# Patient Record
Sex: MALE | Race: WHITE | Employment: OTHER | URBAN - METROPOLITAN AREA
[De-identification: names, ages, dates, MRNs, and addresses within clinical notes are randomized per-mention and may not be internally consistent; named-entity substitution may affect disease eponyms.]

---

## 2017-05-15 ENCOUNTER — NON-PROVIDER VISIT (OUTPATIENT)
Dept: PULMONOLOGY | Facility: HOSPICE | Age: 77
End: 2017-05-15
Payer: MEDICARE

## 2017-05-15 ENCOUNTER — OFFICE VISIT (OUTPATIENT)
Dept: PULMONOLOGY | Facility: HOSPICE | Age: 77
End: 2017-05-15
Payer: MEDICARE

## 2017-05-15 VITALS
BODY MASS INDEX: 30.81 KG/M2 | WEIGHT: 208 LBS | HEART RATE: 60 BPM | RESPIRATION RATE: 16 BRPM | SYSTOLIC BLOOD PRESSURE: 138 MMHG | HEIGHT: 69 IN | DIASTOLIC BLOOD PRESSURE: 82 MMHG | OXYGEN SATURATION: 92 %

## 2017-05-15 DIAGNOSIS — R91.8 PULMONARY NODULES: ICD-10-CM

## 2017-05-15 DIAGNOSIS — G47.00 INSOMNIA, UNSPECIFIED TYPE: ICD-10-CM

## 2017-05-15 DIAGNOSIS — J43.2 CENTRILOBULAR EMPHYSEMA (HCC): ICD-10-CM

## 2017-05-15 DIAGNOSIS — G47.33 OBSTRUCTIVE SLEEP APNEA: ICD-10-CM

## 2017-05-15 PROCEDURE — G8432 DEP SCR NOT DOC, RNG: HCPCS | Performed by: NURSE PRACTITIONER

## 2017-05-15 PROCEDURE — 94060 EVALUATION OF WHEEZING: CPT | Performed by: INTERNAL MEDICINE

## 2017-05-15 PROCEDURE — 4040F PNEUMOC VAC/ADMIN/RCVD: CPT | Performed by: NURSE PRACTITIONER

## 2017-05-15 PROCEDURE — G8419 CALC BMI OUT NRM PARAM NOF/U: HCPCS | Performed by: NURSE PRACTITIONER

## 2017-05-15 PROCEDURE — 94726 PLETHYSMOGRAPHY LUNG VOLUMES: CPT | Performed by: INTERNAL MEDICINE

## 2017-05-15 PROCEDURE — 99214 OFFICE O/P EST MOD 30 MIN: CPT | Performed by: NURSE PRACTITIONER

## 2017-05-15 PROCEDURE — 1036F TOBACCO NON-USER: CPT | Performed by: NURSE PRACTITIONER

## 2017-05-15 PROCEDURE — 94729 DIFFUSING CAPACITY: CPT | Performed by: INTERNAL MEDICINE

## 2017-05-15 PROCEDURE — 1101F PT FALLS ASSESS-DOCD LE1/YR: CPT | Mod: 8P | Performed by: NURSE PRACTITIONER

## 2017-05-15 ASSESSMENT — PULMONARY FUNCTION TESTS
FEV1: 2.62
FEV1_PERCENT_PREDICTED: 104
FEV1_PERCENT_PREDICTED: 100
FEV1/FVC_PERCENT_PREDICTED: 75
FEV1/FVC_PERCENT_PREDICTED: 74
FEV1/FVC_PERCENT_CHANGE: 150
FVC: 4.74
FEV1: 2.53
FEV1/FVC: 53.69
FEV1/FVC_PERCENT_PREDICTED: 72
FVC_PERCENT_PREDICTED: 135
FEV1_PERCENT_CHANGE: 2
FVC_PERCENT_PREDICTED: 138
FEV1_PERCENT_CHANGE: 3
FEV1/FVC: 53
FEV1_PREDICTED: 2.51
FVC: 4.88
FVC_PREDICTED: 3.51

## 2017-05-15 NOTE — PROGRESS NOTES
Chief Complaint   Patient presents with   • Follow-Up       HPI:  Danielito Tolliver is a 76 y.o. year old male here today for follow-up on emphysema and JEFF.  PFT 2/12/2016 indicates % predicted, FEV1 of 2.36 L or 92% predicted, FEV1/FVC ratio 64, RV with 151% predicted, TLC is 124% predicted, and a DLCO of 90% predicted.  These are overall normal spirometry with slight obstructive ventilatory dysfunction.  He did have a positive bronchodilator response. Repeat PFT 5/15/2017 indicates FVC 4.74 L or 135% predicted, FEV1 2.53 L or 100% predicted, FEV1/FVC ratio 53, %, DLCO 90% predicted. Patient states he started exercising and losing weight and his dyspnea has significantly improved. He's been off any inhalers for 3 weeks. He was previoulsy compliant with Spiriva Respimat two inhalations once daily and Advair 250/50 mcg bid. He has not needed Proair. 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 Since then his energy levels have been better and he has been losing weight. He has lost 40lbs since his last OV. He started using beat juice daily as well which he contributes feeling better to.     He has a history of an abnormal CT scan with pulmonary nodules. The nodules were stable for 2 years; last CT was 7/21/2009.   Chest x-ray today indicates no consolidation or effusions.  cardiac silhouette, bones and vascular structure grossly unremarkable.  Lungs appear mildly hyperinflated.  There is a nodular opacity overlying the left fifth anterior rib.  CT scan at [facility] 2/23/16 indicates 8mm noncalcified pulmonary nodule in the right middle lobe and severe centrilobular emphysema. Addendum noted stability of nodule and no more imaging warranted. Results reviewed with patient.    Polysomnogram indicated an AHI of 15, minimum 02 saturation of 77%.  He has been compliant on auto  "Pap 5-15 cm of water pressure with 4 L bleed of oxygen.  CNOX 10/17/15 indicated normal oximetry on this setting. Compliance card 2/14/17-5/14/17 indicates 100% compliance, avg nightly use of 6hr 14min, AHi 0.9 and minimal mask leak. He tolerates mask/pressure well. He denies EDS or morning headaches. He has used Ambien 10mg rarely since last OV. He notes 1/3 of tablet maybe 3x's of use.  ROS: As per HPI and otherwise negative if not stated.    Past Medical History   Diagnosis Date   • Indigestion    • Pain      low back pain left side   • Hypercholesteremia    • JEFF (obstructive sleep apnea)      AUTO CPAP 5-15CM WITH O2 4 LPM   • Snoring      Uses CPAP   • Psychiatric problem      takes med for depression   • Dental disorder      Full Upper   • Cold      coughing up \"dirty\" sputum   • Infection      left knee post TKA   • Emphysema of lung (CMS-HCC)    • CATARACT      IOL OU   • Breath shortness      Uses 02 @ 4L per nc at home prn        Past Surgical History   Procedure Laterality Date   • Cataract extraction with iol       B/L   • Bronchoscopy-endo  3/25/2009     Performed by SINCERE PABLO at SURGERY [facility]   • Other       TKA B/L   • Other orthopedic surgery  3-21-16     \"Knee ReplacementSurgeries Left kneex3 Rightx2\"    • Other cardiac surgery  2013     \"Open Heart-Coronary Artery Bypass, Heart Stent\"   • Knee arthrotomy Left 3/24/2016     Procedure: KNEE ARTHROTOMY-HARDWARE REMOVAL AND HETEROTOPIC OSSIFICATION;  Surgeon: Yasmani Bradley M.D.;  Location: SURGERY [facility];  Service:        History reviewed. No pertinent family history.    Social History     Social History   • Marital Status:      Spouse Name: N/A   • Number of Children: N/A   • Years of Education: N/A     Occupational History   • Not on file.     Social History Main Topics   • Smoking status: Former Smoker -- 1.00 packs/day for 25 years     Types: Cigarettes     Quit date: 01/01/1998   • Smokeless tobacco: Never " "Used   • Alcohol Use: No   • Drug Use: No   • Sexual Activity: Not on file     Other Topics Concern   • Not on file     Social History Narrative       Allergies as of 05/15/2017 - Cordell as Reviewed 05/15/2017   Allergen Reaction Noted   • Morphine Anxiety 03/21/2016        @Vital signs for this encounter:  Filed Vitals:    05/15/17 1300   Height: 1.753 m (5' 9.02\")   Weight: 94.348 kg (208 lb)   Weight % change since last entry.: 0 %   BP: 138/82   Pulse: 60   BMI (Calculated): 30.7   Resp: 16   O2 sat % room air: 92 %       Current medications as of today   Current Outpatient Prescriptions   Medication Sig Dispense Refill   • EFFIENT 10 MG Tab TK 1 T PO QD  4   • aspirin 81 MG tablet Take 81 mg by mouth every day.     • Tiotropium Bromide-Olodaterol (STIOLTO RESPIMAT) 2.5-2.5 MCG/ACT Aero Soln Take 2 Puffs by mouth every day. 1 Inhaler 5   • zolpidem (AMBIEN) 10 MG Tab Take 1 Tab by mouth at bedtime as needed for Sleep. May take 1/2 - 1 tab nightly 30 Tab 1   • fluticasone-salmeterol (ADVAIR DISKUS) 250-50 MCG/DOSE AEROSOL POWDER, BREATH ACTIVATED Inhale 1 Puff by mouth 2 times a day. 1 Inhaler 5   • Tiotropium Bromide Monohydrate (SPIRIVA RESPIMAT) 2.5 MCG/ACT Aero Soln Inhale 2 Puffs by mouth every day. 1 Inhaler 5   • albuterol 108 (90 BASE) MCG/ACT Aero Soln inhalation aerosol Inhale 2 Puffs by mouth every 6 hours as needed for Shortness of Breath. 8.5 g 5   • simvastatin (ZOCOR) 80 MG tablet Take 80 mg by mouth every day.     • celecoxib (CELEBREX) 200 MG Cap Take 200 mg by mouth Pre-Op Once.     • Multiple Vitamins-Minerals (OCUVITE PO) Take 1 Cap by mouth every day.     • Multiple Vitamins-Minerals (CENTRUM SILVER ADULT 50+ PO) Take 1 Tab by mouth every day.     • Omega-3 Fatty Acids (FISH OIL PO) Take 1 Cap by mouth every day.     • Coenzyme Q10 (COQ-10) 100 MG Cap Take 1 Cap by mouth every day.     • Ascorbic Acid (VITAMIN C PO) Take 500 mg by mouth every day.     • lisinopril (PRINIVIL) 5 MG Tab Take 5 mg " by mouth every day.     • Cobalamine Combinations (VITAMIN B12-FOLIC ACID PO) Take 1 Tab by mouth every day.     • CEPHALEXIN 500 MG PO CAPS Take 1,000 mg by mouth every day. For an infection in the leg for 7 years.     • aspirin (ASA) 325 MG Tab Take 325 mg by mouth every day.       No current facility-administered medications for this visit.         Physical Exam:   Gen:           Alert and oriented, No apparent distress. Mood and affect appropriate, normal interaction with examiner.  Eyes:          PERRL, EOM intact, sclere white, conjunctive moist.  Ears:          Not examined.  Hearing:     Grossly intact.  Nose:          Normal, no lesions or deformities.  Dentition:    Good dentition.  Oropharynx:   Tongue normal, posterior pharynx without erythema or exudate.  Mallampati Classification: 3  Neck:        Supple, trachea midline, no masses.  Respiratory Effort: No intercostal retractions or use of accessory muscles.   Lung Auscultation:      Clear to auscultation bilaterally; no rales, rhonchi or wheezing.  CV:            Regular rate and rhythm. No murmurs, rubs or gallops.  Abd:           Not examined.   Lymphadenopathy: Not examined.  Gait and Station: Normal.  Digits and Nails: No clubbing, cyanosis, petechiae, or nodes.   Cranial Nerves: II-XII grossly intact.  Skin:        No rashes, lesions or ulcers noted.               Ext:           No cyanosis or edema.      Assessment:  1. Centrilobular emphysema (CMS-HCC)     2. Pulmonary nodules     3. Obstructive sleep apnea     4. Insomnia, unspecified type         Immunizations:    Flu:2016  Pneumovax 23:2014  Prevnar 13:2015    Plan:  1. Patient's COPD has significantly improved with 40lb weight loss. He notes minimal dyspnea and reviewed appropriate RESHMA use. If dyspnea increased, advised returning to On license of [facility] 2puffs once daily.  2. Continue CPAP nightly w/O2 bleed in.  3. Continue with regular exercise/weight loss.  4. Discussed sleep and respiratory  hygiene.  5. DME order mask/supplies.  6. Follow up in 1 year with compliance card, sooner if needed.

## 2017-05-15 NOTE — PROCEDURES
Technician: Kim Reyes, RRT/CPFT  Technician Comments:  Good patient effort & cooperation.  The results of this test meet the ATS/ERS standards for acceptability and repeatability.  Test was performed on the [employer] Body Plethysmograph- Elite DX system.  Predicted equations for Spirometry are NHanes, DLCO- [facility].  The DLCO was uncorrected for Hgb.  A bronchodilator of Ventolin HFA- 2puffs via spacer were administered.    Interpretation:    Spirometry reveals a normal forced vital capacity and FEV1 but a significantly reduced FEV1 to FVC ratio. Compatible with an obstructive pattern which the flow volume loop confirms. There is no significant change in flow rates following inhaled bronchodilators. Hyperinflation is evident from the increase in residual volume 250% of predicted. Diffusion and airways resistance are both normal. Overall this PFT is compatible with an obstructive pattern but normal large airway flow rates with a reduction in the FEF 25-75%, indicative of small airways dysfunction.

## 2017-05-15 NOTE — MR AVS SNAPSHOT
"        Danielito Tolliver   5/15/2017 12:00 PM   Appointment   MRN: [id number]    Department:  [facility]   Dept Phone:  [phone]    Description:  Male : 1940   Provider:  [id number]           Allergies as of 5/15/2017     Allergen Noted Reactions    Morphine 2016   Anxiety    \"Hard time staying in the bed\"      Vital Signs     Smoking Status                   Former Smoker           Basic Information     Date Of Birth Sex Race Ethnicity Preferred Language    1940 Male White Unknown English      Your appointments     May 15, 2017  1:00 PM   Established Patient Pul with ERLIN Garcia   [facility] (--)    [address]  [address]  [place] [address] [phone]              Problem List              ICD-10-CM Priority Class Noted - Resolved    dyslipidemia I10   2012 - Present    Dyslipidemia E78.5   2012 - Present    Artificial joint pain (CMS-HCC) T84.84XA   3/24/2016 - Present    Centrilobular emphysema (CMS-HCC) J43.2   2016 - Present    Obstructive sleep apnea G47.33   2016 - Present    Pulmonary nodules R91.8   2016 - Present    Insomnia G47.00   2016 - Present      Health Maintenance        Date Due Completion Dates    IMM DTaP/Tdap/Td Vaccine (1 - Tdap) 1959 ---    COLONOSCOPY 1990 ---    IMM ZOSTER VACCINE 2000 ---    IMM PNEUMOCOCCAL 65+ (ADULT) LOW/MEDIUM RISK SERIES (2 of 2 - PPSV23) 2016            Current Immunizations     13-VALENT PCV PREVNAR 2015    Influenza Vaccine Adult HD 2016, 2015      Below and/or attached are the medications your provider expects you to take. Review all of your home medications and newly ordered medications with your provider and/or pharmacist. Follow medication instructions as directed by your provider and/or pharmacist. Please keep your medication list with you and share with your provider. Update the information when medications " are discontinued, doses are changed, or new medications (including over-the-counter products) are added; and carry medication information at all times in the event of emergency situations     Allergies:  MORPHINE - Anxiety               Medications  Valid as of: May 15, 2017 - 12:51 PM    Generic Name Brand Name Tablet Size Instructions for use    Albuterol Sulfate (Aero Soln) albuterol 108 (90 BASE) MCG/ACT Inhale 2 Puffs by mouth every 6 hours as needed for Shortness of Breath.        Ascorbic Acid   Take 500 mg by mouth every day.        Aspirin (Tab)  MG Take 325 mg by mouth every day.        Aspirin (Tab) aspirin 81 MG Take 81 mg by mouth every day.        Celecoxib (Cap) CELEBREX 200 MG Take 200 mg by mouth Pre-Op Once.        Cephalexin (Cap) KEFLEX 500 MG Take 1,000 mg by mouth every day. For an infection in the leg for 7 years.        Cobalamine Combinations   Take 1 Tab by mouth every day.        Coenzyme Q10 (Cap) CoQ-10 100 MG Take 1 Cap by mouth every day.        Fluticasone-Salmeterol (AEROSOL POWDER, BREATH ACTIVATED) ADVAIR 250-50 MCG/DOSE Inhale 1 Puff by mouth 2 times a day.        Lisinopril (Tab) PRINIVIL 5 MG Take 5 mg by mouth every day.        Multiple Vitamins-Minerals   Take 1 Cap by mouth every day.        Multiple Vitamins-Minerals   Take 1 Tab by mouth every day.        Omega-3 Fatty Acids   Take 1 Cap by mouth every day.        Prasugrel HCl (Tab) EFFIENT 10 MG TK 1 T PO QD        Simvastatin (Tab) ZOCOR 80 MG Take 80 mg by mouth every day.        Tiotropium Bromide Monohydrate (Aero Soln) Tiotropium Bromide Monohydrate 2.5 MCG/ACT Inhale 2 Puffs by mouth every day.        Tiotropium Bromide-Olodaterol (Aero Soln) Tiotropium Bromide-Olodaterol 2.5-2.5 MCG/ACT Take 2 Puffs by mouth every day.        Zolpidem Tartrate (Tab) AMBIEN 10 MG Take 1 Tab by mouth at bedtime as needed for Sleep. May take 1/2 - 1 tab nightly        .                 Medicines prescribed today were sent to:       [id number] - [place] [address] [address]  AT [facility] OF [facility] & [place] [id number] [facility] [address]    Phone: [phone] Fax: [phone]    Open 24 Hours?: No      Medication refill instructions:       If your prescription bottle indicates you have medication refills left, it is not necessary to call your provider’s office. Please contact your pharmacy and they will refill your medication.    If your prescription bottle indicates you do not have any refills left, you may request refills at any time through one of the following ways: The online [employer] system (except Urgent Care), by calling your provider’s office, or by asking your pharmacy to contact your provider’s office with a refill request. Medication refills are processed only during regular business hours and may not be available until the next business day. Your provider may request additional information or to have a follow-up visit with you prior to refilling your medication.   *Please Note: Medication refills are assigned a new Rx number when refilled electronically. Your pharmacy may indicate that no refills were authorized even though a new prescription for the same medication is available at the pharmacy. Please request the medicine by name with the pharmacy before contacting your provider for a refill.        Other Notes About Your Plan     DME: [facility] [id number]           MyChart Status: Patient Declined

## 2017-05-15 NOTE — MR AVS SNAPSHOT
"        Danielito Tolliver   5/15/2017 1:00 PM   Office Visit   MRN: [id number]    Department:  [facility]   Dept Phone:  [phone]    Description:  Male : 1940   Provider:  ERLIN Garcia           Reason for Visit     Follow-Up           Allergies as of 5/15/2017     Allergen Noted Reactions    Morphine 2016   Anxiety    \"Hard time staying in the bed\"      You were diagnosed with     Centrilobular emphysema (CMS-HCC)   [295019]       Pulmonary nodules   [855452]       Obstructive sleep apnea   [618776]       Insomnia, unspecified type   [0219567]         Vital Signs     Blood Pressure Pulse Respirations Height Weight Body Mass Index    138/82 mmHg 60 16 1.753 m (5' 9.02\") 94.348 kg (208 lb) 30.70 kg/m2    Oxygen Saturation Smoking Status                92% Former Smoker          Basic Information     Date Of Birth Sex Race Ethnicity Preferred Language    1940 Male White Unknown English      Problem List              ICD-10-CM Priority Class Noted - Resolved    dyslipidemia I10   2012 - Present    Dyslipidemia E78.5   2012 - Present    Artificial joint pain (CMS-HCC) T84.84XA   3/24/2016 - Present    Centrilobular emphysema (CMS-HCC) J43.2   2016 - Present    Obstructive sleep apnea G47.33   2016 - Present    Pulmonary nodules R91.8   2016 - Present    Insomnia G47.00   2016 - Present      Health Maintenance        Date Due Completion Dates    IMM DTaP/Tdap/Td Vaccine (1 - Tdap) 1959 ---    COLONOSCOPY 1990 ---    IMM ZOSTER VACCINE 2000 ---    IMM PNEUMOCOCCAL 65+ (ADULT) LOW/MEDIUM RISK SERIES (2 of 2 - PPSV23) 2016            Current Immunizations     13-VALENT PCV PREVNAR 2015    Influenza Vaccine Adult HD 2016, 2015      Below and/or attached are the medications your provider expects you to take. Review all of your home medications and newly ordered medications with your provider and/or " pharmacist. Follow medication instructions as directed by your provider and/or pharmacist. Please keep your medication list with you and share with your provider. Update the information when medications are discontinued, doses are changed, or new medications (including over-the-counter products) are added; and carry medication information at all times in the event of emergency situations     Allergies:  MORPHINE - Anxiety               Medications  Valid as of: May 15, 2017 -  1:34 PM    Generic Name Brand Name Tablet Size Instructions for use    Albuterol Sulfate (Aero Soln) albuterol 108 (90 BASE) MCG/ACT Inhale 2 Puffs by mouth every 6 hours as needed for Shortness of Breath.        Ascorbic Acid   Take 500 mg by mouth every day.        Aspirin (Tab)  MG Take 325 mg by mouth every day.        Aspirin (Tab) aspirin 81 MG Take 81 mg by mouth every day.        Celecoxib (Cap) CELEBREX 200 MG Take 200 mg by mouth Pre-Op Once.        Cephalexin (Cap) KEFLEX 500 MG Take 1,000 mg by mouth every day. For an infection in the leg for 7 years.        Cobalamine Combinations   Take 1 Tab by mouth every day.        Coenzyme Q10 (Cap) CoQ-10 100 MG Take 1 Cap by mouth every day.        Fluticasone-Salmeterol (AEROSOL POWDER, BREATH ACTIVATED) ADVAIR 250-50 MCG/DOSE Inhale 1 Puff by mouth 2 times a day.        Lisinopril (Tab) PRINIVIL 5 MG Take 5 mg by mouth every day.        Multiple Vitamins-Minerals   Take 1 Cap by mouth every day.        Multiple Vitamins-Minerals   Take 1 Tab by mouth every day.        Omega-3 Fatty Acids   Take 1 Cap by mouth every day.        Prasugrel HCl (Tab) EFFIENT 10 MG TK 1 T PO QD        Simvastatin (Tab) ZOCOR 80 MG Take 80 mg by mouth every day.        Tiotropium Bromide Monohydrate (Aero Soln) Tiotropium Bromide Monohydrate 2.5 MCG/ACT Inhale 2 Puffs by mouth every day.        Tiotropium Bromide-Olodaterol (Aero Soln) Tiotropium Bromide-Olodaterol 2.5-2.5 MCG/ACT Take 2 Puffs by mouth  every day.        Zolpidem Tartrate (Tab) AMBIEN 10 MG Take 1 Tab by mouth at bedtime as needed for Sleep. May take 1/2 - 1 tab nightly        .                 Medicines prescribed today were sent to:     [id number] - [place] AT [facility] & [address] [address]    Phone: [phone] Fax: [phone]    Open 24 Hours?: No      Medication refill instructions:       If your prescription bottle indicates you have medication refills left, it is not necessary to call your provider’s office. Please contact your pharmacy and they will refill your medication.    If your prescription bottle indicates you do not have any refills left, you may request refills at any time through one of the following ways: The online [employer] system (except Urgent Care), by calling your provider’s office, or by asking your pharmacy to contact your provider’s office with a refill request. Medication refills are processed only during regular business hours and may not be available until the next business day. Your provider may request additional information or to have a follow-up visit with you prior to refilling your medication.   *Please Note: Medication refills are assigned a new Rx number when refilled electronically. Your pharmacy may indicate that no refills were authorized even though a new prescription for the same medication is available at the pharmacy. Please request the medicine by name with the pharmacy before contacting your provider for a refill.        Your To Do List     Future Labs/Procedures Complete By Expires    AMB SPIROMETRY  As directed 5/15/2018    Comments:    At next OV      Other Notes About Your Plan     DME: [facility] [phone] [phone]           [employer] Status: Patient Declined

## 2018-06-01 ENCOUNTER — OFFICE VISIT (OUTPATIENT)
Dept: PULMONOLOGY | Facility: HOSPICE | Age: 78
End: 2018-06-01
Payer: MEDICARE

## 2018-06-01 VITALS
DIASTOLIC BLOOD PRESSURE: 62 MMHG | HEART RATE: 67 BPM | OXYGEN SATURATION: 93 % | WEIGHT: 212 LBS | BODY MASS INDEX: 31.4 KG/M2 | TEMPERATURE: 97.9 F | SYSTOLIC BLOOD PRESSURE: 138 MMHG | HEIGHT: 69 IN | RESPIRATION RATE: 16 BRPM

## 2018-06-01 DIAGNOSIS — G47.33 OBSTRUCTIVE SLEEP APNEA: ICD-10-CM

## 2018-06-01 DIAGNOSIS — G47.00 INSOMNIA, UNSPECIFIED TYPE: ICD-10-CM

## 2018-06-01 DIAGNOSIS — J43.2 CENTRILOBULAR EMPHYSEMA (HCC): ICD-10-CM

## 2018-06-01 DIAGNOSIS — R91.8 PULMONARY NODULES: ICD-10-CM

## 2018-06-01 PROCEDURE — 99214 OFFICE O/P EST MOD 30 MIN: CPT | Performed by: NURSE PRACTITIONER

## 2018-06-01 NOTE — PROGRESS NOTES
"Chief Complaint   Patient presents with   • Annual Exam       HPI:  Danielito Tolliver is a 77 y.o. year old male here today for follow-up on emphysema and JEFF. Former smoker, quit 1998 with 25PYH. PFT 5/15/2017 indicates FVC 4.74 L or 135% predicted, FEV1 2.53 L or 100% predicted, FEV1/FVC ratio 53, %, DLCO 90% predicted. Patient underwent heart cath last year and started having weight loss. His dyspnea significanly improved and he stopped inhalers.  He started using beat juice daily as well which he contributes feeling better to. He feels great and is motivated to lose 10lbs. He denies cough, phlegm, chest pain or wheezing. Regular exercise makes his breathing better he states.     He has a history of an abnormal CT scan with pulmonary nodules. The nodules were stable for 2 years; last CT was 7/21/2009. CT scan at [facility] 2/23/16 indicates 8mm noncalcified pulmonary nodule in the right middle lobe and severe centrilobular emphysema. Addendum noted stability of nodule and no more imaging warranted. Results reviewed with patient.     Polysomnogram indicated an AHI of 15, minimum 02 saturation of 77%.  He has been compliant on auto Pap 5-15 cm of water pressure with 4 L bleed of oxygen.  CNOX 10/17/15 indicated normal oximetry on this setting. Compliance card 5/2/2018 through 5/31/2018 indicates 100% compliance, average daily usage of 6 hours 41 minutes, no significant mask leaking and an overall AHI of 1.2. Mean pressure 5.8 cm H2O.. He tolerates mask/pressure well. He denies EDS or morning headaches. Sleep study weight 233lbs and weight today 212lbs.      ROS: As per HPI and otherwise negative if not stated.    Past Medical History:   Diagnosis Date   • Breath shortness     Uses 02 @ 4L per nc at home prn    • CATARACT     IOL OU   • Cold     coughing up \"dirty\" sputum   • Dental disorder     Full Upper   • Emphysema of lung (HCC)    • Hypercholesteremia    • Indigestion    • Infection     left knee post TKA " "  • JEFF (obstructive sleep apnea)     AUTO CPAP 5-15CM WITH O2 4 LPM   • Pain     low back pain left side   • Psychiatric problem     takes med for depression   • Snoring     Uses CPAP       Past Surgical History:   Procedure Laterality Date   • KNEE ARTHROTOMY Left 3/24/2016    Procedure: KNEE ARTHROTOMY-HARDWARE REMOVAL AND HETEROTOPIC OSSIFICATION;  Surgeon: Yasmani Bradley M.D.;  Location: SURGERY [facility];  Service:    • OTHER ORTHOPEDIC SURGERY  3-21-16    \"Knee ReplacementSurgeries Left kneex3 Rightx2\"    • OTHER CARDIAC SURGERY  2013    \"Open Heart-Coronary Artery Bypass, Heart Stent\"   • BRONCHOSCOPY-ENDO  3/25/2009    Performed by SINCERE PABLO at SURGERY [facility]   • CATARACT EXTRACTION WITH IOL      B/L   • OTHER      TKA B/L       History reviewed. No pertinent family history.    Social History     Social History   • Marital status:      Spouse name: N/A   • Number of children: N/A   • Years of education: N/A     Occupational History   • Not on file.     Social History Main Topics   • Smoking status: Former Smoker     Packs/day: 1.00     Years: 25.00     Types: Cigarettes     Quit date: 1/1/1998   • Smokeless tobacco: Never Used   • Alcohol use No   • Drug use: No   • Sexual activity: Not on file     Other Topics Concern   • Not on file     Social History Narrative   • No narrative on file       Allergies as of 06/01/2018 - Reviewed 06/01/2018   Allergen Reaction Noted   • Morphine Anxiety 03/21/2016        @Vital signs for this encounter:  Vitals:    06/01/18 1342   BP: 138/62   Pulse: 67   Resp: 16   Temp: 36.6 °C (97.9 °F)   O2 sat % room air: 93 %       Current medications as of today   Current Outpatient Prescriptions   Medication Sig Dispense Refill   • aspirin 81 MG tablet Take 81 mg by mouth every day.     • simvastatin (ZOCOR) 80 MG tablet Take 80 mg by mouth every day.     • Multiple Vitamins-Minerals (OCUVITE PO) Take 1 Cap by mouth every day.     • Multiple " Vitamins-Minerals (CENTRUM SILVER ADULT 50+ PO) Take 1 Tab by mouth every day.     • Omega-3 Fatty Acids (FISH OIL PO) Take 1 Cap by mouth every day.     • Coenzyme Q10 (COQ-10) 100 MG Cap Take 1 Cap by mouth every day.     • Ascorbic Acid (VITAMIN C PO) Take 500 mg by mouth every day.     • lisinopril (PRINIVIL) 5 MG Tab Take 5 mg by mouth every day.     • Cobalamine Combinations (VITAMIN B12-FOLIC ACID PO) Take 1 Tab by mouth every day.     • CEPHALEXIN 500 MG PO CAPS Take 1,000 mg by mouth every day. For an infection in the leg for 7 years.     • sertraline (ZOLOFT) 50 MG Tab TK 1 T PO QD  2   • BOOSTRIX 5-2.5-18.5 Suspension ADM 0.5ML IM UTD  0   • EFFIENT 10 MG Tab TK 1 T PO QD  4   • Tiotropium Bromide-Olodaterol (STIOLTO RESPIMAT) 2.5-2.5 MCG/ACT Aero Soln Take 2 Puffs by mouth every day. 1 Inhaler 5   • zolpidem (AMBIEN) 10 MG Tab Take 1 Tab by mouth at bedtime as needed for Sleep. May take 1/2 - 1 tab nightly 30 Tab 1   • fluticasone-salmeterol (ADVAIR DISKUS) 250-50 MCG/DOSE AEROSOL POWDER, BREATH ACTIVATED Inhale 1 Puff by mouth 2 times a day. 1 Inhaler 5   • Tiotropium Bromide Monohydrate (SPIRIVA RESPIMAT) 2.5 MCG/ACT Aero Soln Inhale 2 Puffs by mouth every day. 1 Inhaler 5   • albuterol 108 (90 BASE) MCG/ACT Aero Soln inhalation aerosol Inhale 2 Puffs by mouth every 6 hours as needed for Shortness of Breath. 8.5 g 5   • celecoxib (CELEBREX) 200 MG Cap Take 200 mg by mouth Pre-Op Once.     • aspirin (ASA) 325 MG Tab Take 325 mg by mouth every day.       No current facility-administered medications for this visit.          Physical Exam:   Gen:           Alert and oriented, No apparent distress. Mood and affect appropriate, normal interaction with examiner.  Eyes:          PERRL, EOM intact, sclere white, conjunctive moist.  Ears:          Not examined.   Hearing:     Grossly intact.  Nose:          Normal, no lesions or deformities.  Dentition:    Good dentition.  Oropharynx:   Tongue normal, posterior  pharynx without erythema or exudate.  Mallampati Classification: 3  Neck:        Supple, trachea midline, no masses.  Respiratory Effort: No intercostal retractions or use of accessory muscles.   Lung Auscultation:      Clear to auscultation bilaterally; no rales, rhonchi or wheezing.  CV:            Regular rate and rhythm. No murmurs, rubs or gallops.  Abd:           Not examined.   Lymphadenopathy: Not examined.  Gait and Station: Normal.  Digits and Nails: No clubbing, cyanosis, petechiae, or nodes.   Cranial Nerves: II-XII grossly intact.  Skin:        No rashes, lesions or ulcers noted.               Ext:           No cyanosis or edema.      Assessment:  1. Centrilobular emphysema (HCC)     2. Pulmonary nodules     3. Obstructive sleep apnea     4. Insomnia, unspecified type         Immunizations:    Flu:11/2017  Pneumovax 23:2014  Prevnar 13:11/2015    Plan: Emphysema and JEFF clinically stable. No change in treatment plan.  1. Continue RESHMA prn if needed.  2. PFT at next OV.  3. Continue CPAP w/O2 bleed in.  DME mask/supplies.  4. Encouraged weight loss.  5. Discussed sleep and respiratory hygiene.  6. Follow up in 1 year at pulmonary clinic with PFT, sooner if needed.  Follow up in 1 year at sleep center with compliance card, sooner if needed.

## 2019-06-03 ENCOUNTER — SLEEP CENTER VISIT (OUTPATIENT)
Dept: SLEEP MEDICINE | Facility: MEDICAL CENTER | Age: 79
End: 2019-06-03
Payer: MEDICARE

## 2019-06-03 VITALS
SYSTOLIC BLOOD PRESSURE: 120 MMHG | RESPIRATION RATE: 16 BRPM | HEIGHT: 69 IN | BODY MASS INDEX: 31.99 KG/M2 | WEIGHT: 216 LBS | DIASTOLIC BLOOD PRESSURE: 62 MMHG | HEART RATE: 62 BPM | OXYGEN SATURATION: 93 %

## 2019-06-03 DIAGNOSIS — G47.00 INSOMNIA, UNSPECIFIED TYPE: ICD-10-CM

## 2019-06-03 DIAGNOSIS — G47.33 OBSTRUCTIVE SLEEP APNEA: Chronic | ICD-10-CM

## 2019-06-03 DIAGNOSIS — Z87.891 FORMER SMOKER: ICD-10-CM

## 2019-06-03 DIAGNOSIS — J43.2 CENTRILOBULAR EMPHYSEMA (HCC): Chronic | ICD-10-CM

## 2019-06-03 PROCEDURE — 99214 OFFICE O/P EST MOD 30 MIN: CPT | Performed by: NURSE PRACTITIONER

## 2019-06-03 NOTE — LETTER
"   ERLIN Garcia  [facility]   [address] [place] [address]  Phone: [phone] - Fax: [phone]           Encounter Date: 6/3/2019  Provider: ERLIN Garcia  Location of Care: [facility]      Patient:   Danielito Tolliver   MR Number: [id number]   YOB: 1940     PROGRESS NOTE:  Chief Complaint   Patient presents with   • Apnea      auto Pap 5-15 cm of water pressure with 4 L bleed of oxygen       HPI:  Danielito Tolliver is a 78 y.o. year old male here today for follow-up on sleep apnea.    PATIENT IS ALSO PULMONARY PATIENT; SEE DICTATION 6/1/18 BY LEILANI SOMMER.    HST 2013 indicated an overall AHI of 15 with a minimum oxygen saturation of 77%.  Patient currently uses auto CPAP 5-15cm H2O with 4 L oxygen bleed in.  CNOX 10/17/2015 indicated normal oximetry on that setting.  Prior compliance download May 2018 indicated greater than 4 hours usage nightly with a reduced AHI of 1.2.  Patient did not bring his card today for updated download.  Patient has lost 17 pounds since original sleep study, he weighed 233 pounds at that time. BMI 31.    Today he notes to continue to use device nightly and feeling rested. He denies AM headaches. He notes [place] He's getting back into his stationary bike and trying to lose weight. He continues to drink beet juice. He denies cardiac or respiratory symptoms.          ROS: As per HPI and otherwise negative if not stated.    Past Medical History:   Diagnosis Date   • Breath shortness     Uses 02 @ 4L per nc at home prn    • CATARACT     IOL OU   • Cold     coughing up \"dirty\" sputum   • Dental disorder     Full Upper   • Emphysema of lung (HCC)    • Hypercholesteremia    • Indigestion    • Infection     left knee post TKA   • JEFF (obstructive sleep apnea)     AUTO CPAP 5-15CM WITH O2 4 LPM   • Pain     low back pain " "left side   • Psychiatric problem     takes med for depression   • Snoring     Uses CPAP       Past Surgical History:   Procedure Laterality Date   • KNEE ARTHROTOMY Left 3/24/2016    Procedure: KNEE ARTHROTOMY-HARDWARE REMOVAL AND HETEROTOPIC OSSIFICATION;  Surgeon: Yasmani Bradley M.D.;  Location: SURGERY [facility];  Service:    • OTHER ORTHOPEDIC SURGERY  3-21-16    \"Knee ReplacementSurgeries Left kneex3 Rightx2\"    • OTHER CARDIAC SURGERY  2013    \"Open Heart-Coronary Artery Bypass, Heart Stent\"   • BRONCHOSCOPY-ENDO  3/25/2009    Performed by SINCERE PABLO at SURGERY [facility]   • CATARACT EXTRACTION WITH IOL      B/L   • OTHER      TKA B/L       History reviewed. No pertinent family history.    Social History     Social History   • Marital status:      Spouse name: N/A   • Number of children: N/A   • Years of education: N/A     Occupational History   • Not on file.     Social History Main Topics   • Smoking status: Former Smoker     Packs/day: 1.00     Years: 25.00     Types: Cigarettes     Quit date: 1/1/1998   • Smokeless tobacco: Never Used   • Alcohol use No   • Drug use: No   • Sexual activity: Not on file     Other Topics Concern   • Not on file     Social History Narrative   • No narrative on file       Allergies as of 06/03/2019 - Reviewed 06/03/2019   Allergen Reaction Noted   • Morphine Anxiety 03/21/2016        @Vital signs for this encounter:  Vitals:    06/03/19 1308   Height: 1.753 m (5' 9\")   Weight: 98 kg (216 lb)   Weight % change since last entry.: 0 %   BP: 120/62   Pulse: 62   BMI (Calculated): 31.9   Resp: 16   O2 sat % room air: 93 %       Current medications as of today   Current Outpatient Prescriptions   Medication Sig Dispense Refill   • Tiotropium Bromide Monohydrate (SPIRIVA RESPIMAT) 2.5 MCG/ACT Aero Soln Inhale  by mouth.     • aspirin 81 MG tablet Take 81 mg by mouth every day.     • simvastatin (ZOCOR) 80 MG tablet Take 80 mg by mouth every day.     • " Multiple Vitamins-Minerals (OCUVITE PO) Take 1 Cap by mouth every day.     • Multiple Vitamins-Minerals (CENTRUM SILVER ADULT 50+ PO) Take 1 Tab by mouth every day.     • Omega-3 Fatty Acids (FISH OIL PO) Take 1 Cap by mouth every day.     • Coenzyme Q10 (COQ-10) 100 MG Cap Take 1 Cap by mouth every day.     • Ascorbic Acid (VITAMIN C PO) Take 500 mg by mouth every day.     • lisinopril (PRINIVIL) 5 MG Tab Take 5 mg by mouth every day.     • Cobalamine Combinations (VITAMIN B12-FOLIC ACID PO) Take 1 Tab by mouth every day.     • CEPHALEXIN 500 MG PO CAPS Take 1,000 mg by mouth every day. For an infection in the leg for 7 years.     • BOOSTRIX 5-2.5-18.5 Suspension ADM 0.5ML IM UTD  0   • albuterol 108 (90 BASE) MCG/ACT Aero Soln inhalation aerosol Inhale 2 Puffs by mouth every 6 hours as needed for Shortness of Breath. 8.5 g 5     No current facility-administered medications for this visit.          Physical Exam:   Gen:           Alert and oriented, No apparent distress. Mood and affect appropriate, normal interaction with examiner.  Eyes:          PERRL, EOM intact, sclere white, conjunctive moist.  Ears:          Not examined.   Hearing:     Grossly intact.  Nose:          Normal, no lesions or deformities.  Dentition:    Good dentition.  Oropharynx:   Tongue normal, posterior pharynx without erythema or exudate.  Mallampati Classification: not examined.  Neck:        Supple, trachea midline, no masses.  Respiratory Effort: No intercostal retractions or use of accessory muscles.   Lung Auscultation:      Clear to auscultation bilaterally; no rales, rhonchi or wheezing.  CV:            Regular rate and rhythm. No murmurs, rubs or gallops.  Abd:           Not examined.   Lymphadenopathy: Not examined.  Gait and Station: Normal.  Digits and Nails: No clubbing, cyanosis, petechiae, or nodes.   Cranial Nerves: II-XII grossly intact.  Skin:        No rashes, lesions or ulcers noted.               Ext:           No  cyanosis or edema.      Assessment:  1. Obstructive sleep apnea     2. Insomnia, unspecified type     3. Former smoker     4. BMI 31.0-31.9,adult  Height And Weight   5. Centrilobular emphysema (HCC)         Immunizations:    Flu:declines  Pneumovax 23:2014  Prevnar 13:11/2015    Plan:  1.  Continue CPAP nightly.  DME mask/supplies.  2.  Discussed sleep hygiene.  3.  Encouraged weight loss.  4.  Follow-up in 1 year with compliance card, sooner if needed.    Please note that this dictation was created using voice recognition software. I have made every reasonable attempt to correct obvious errors, but it is possible there are errors of grammar and possibly content that I did not discover before finalizing the note.      Electronically signed by MINH GarciaP.R.NVinicius  on 06/03/19    BEVERLY Crouch M.D.  [facility] [place] [address]  VIA Facsimile: [phone]

## 2019-06-03 NOTE — PROGRESS NOTES
"Chief Complaint   Patient presents with   • Apnea      auto Pap 5-15 cm of water pressure with 4 L bleed of oxygen       HPI:  Danielito Tolliver is a 78 y.o. year old male here today for follow-up on sleep apnea.    PATIENT IS ALSO PULMONARY PATIENT; SEE DICTATION 6/1/18 BY LEILANI SOMMER.    HST 2013 indicated an overall AHI of 15 with a minimum oxygen saturation of 77%.  Patient currently uses auto CPAP 5-15cm H2O with 4 L oxygen bleed in.  CNOX 10/17/2015 indicated normal oximetry on that setting.  Prior compliance download May 2018 indicated greater than 4 hours usage nightly with a reduced AHI of 1.2.  Patient did not bring his card today for updated download.  Patient has lost 17 pounds since original sleep study, he weighed 233 pounds at that time. BMI 31.    Today he notes to continue to use device nightly and feeling rested. He denies AM headaches. He notes daytime fatigue when [place] working [place] the [place]. He's getting back into his stationary bike and trying to lose weight. He continues to drink beet juice. He denies cardiac or respiratory symptoms.          ROS: As per HPI and otherwise negative if not stated.    Past Medical History:   Diagnosis Date   • Breath shortness     Uses 02 @ 4L per nc at home prn    • CATARACT     IOL OU   • Cold     coughing up \"dirty\" sputum   • Dental disorder     Full Upper   • Emphysema of lung (HCC)    • Hypercholesteremia    • Indigestion    • Infection     left knee post TKA   • JEFF (obstructive sleep apnea)     AUTO CPAP 5-15CM WITH O2 4 LPM   • Pain     low back pain left side   • Psychiatric problem     takes med for depression   • Snoring     Uses CPAP       Past Surgical History:   Procedure Laterality Date   • KNEE ARTHROTOMY Left 3/24/2016    Procedure: KNEE ARTHROTOMY-HARDWARE REMOVAL AND HETEROTOPIC OSSIFICATION;  Surgeon: Yasmani Bradley M.D.;  Location: SURGERY [facility];  Service:    • OTHER ORTHOPEDIC SURGERY  3-21-16    \"Knee ReplacementSurgeries " "Left kneex3 Rightx2\"    • OTHER CARDIAC SURGERY  2013    \"Open Heart-Coronary Artery Bypass, Heart Stent\"   • BRONCHOSCOPY-ENDO  3/25/2009    Performed by SINCERE PABLO at [facility]   • CATARACT EXTRACTION WITH IOL      B/L   • OTHER      TKA B/L       History reviewed. No pertinent family history.    Social History     Social History   • Marital status:      Spouse name: N/A   • Number of children: N/A   • Years of education: N/A     Occupational History   • Not on file.     Social History Main Topics   • Smoking status: Former Smoker     Packs/day: 1.00     Years: 25.00     Types: Cigarettes     Quit date: 1/1/1998   • Smokeless tobacco: Never Used   • Alcohol use No   • Drug use: No   • Sexual activity: Not on file     Other Topics Concern   • Not on file     Social History Narrative   • No narrative on file       Allergies as of 06/03/2019 - Reviewed 06/03/2019   Allergen Reaction Noted   • Morphine Anxiety 03/21/2016        @Vital signs for this encounter:  Vitals:    06/03/19 1308   Height: 1.753 m (5' 9\")   Weight: 98 kg (216 lb)   Weight % change since last entry.: 0 %   BP: 120/62   Pulse: 62   BMI (Calculated): 31.9   Resp: 16   O2 sat % room air: 93 %       Current medications as of today   Current Outpatient Prescriptions   Medication Sig Dispense Refill   • Tiotropium Bromide Monohydrate (SPIRIVA RESPIMAT) 2.5 MCG/ACT Aero Soln Inhale  by mouth.     • aspirin 81 MG tablet Take 81 mg by mouth every day.     • simvastatin (ZOCOR) 80 MG tablet Take 80 mg by mouth every day.     • Multiple Vitamins-Minerals (OCUVITE PO) Take 1 Cap by mouth every day.     • Multiple Vitamins-Minerals (CENTRUM SILVER ADULT 50+ PO) Take 1 Tab by mouth every day.     • Omega-3 Fatty Acids (FISH OIL PO) Take 1 Cap by mouth every day.     • Coenzyme Q10 (COQ-10) 100 MG Cap Take 1 Cap by mouth every day.     • Ascorbic Acid (VITAMIN C PO) Take 500 mg by mouth every day.     • lisinopril (PRINIVIL) 5 MG " Tab Take 5 mg by mouth every day.     • Cobalamine Combinations (VITAMIN B12-FOLIC ACID PO) Take 1 Tab by mouth every day.     • CEPHALEXIN 500 MG PO CAPS Take 1,000 mg by mouth every day. For an infection in the leg for 7 years.     • BOOSTRIX 5-2.5-18.5 Suspension ADM 0.5ML IM UTD  0   • albuterol 108 (90 BASE) MCG/ACT Aero Soln inhalation aerosol Inhale 2 Puffs by mouth every 6 hours as needed for Shortness of Breath. 8.5 g 5     No current facility-administered medications for this visit.          Physical Exam:   Gen:           Alert and oriented, No apparent distress. Mood and affect appropriate, normal interaction with examiner.  Eyes:          PERRL, EOM intact, sclere white, conjunctive moist.  Ears:          Not examined.   Hearing:     Grossly intact.  Nose:          Normal, no lesions or deformities.  Dentition:    Good dentition.  Oropharynx:   Tongue normal, posterior pharynx without erythema or exudate.  Mallampati Classification: not examined.  Neck:        Supple, trachea midline, no masses.  Respiratory Effort: No intercostal retractions or use of accessory muscles.   Lung Auscultation:      Clear to auscultation bilaterally; no rales, rhonchi or wheezing.  CV:            Regular rate and rhythm. No murmurs, rubs or gallops.  Abd:           Not examined.   Lymphadenopathy: Not examined.  Gait and Station: Normal.  Digits and Nails: No clubbing, cyanosis, petechiae, or nodes.   Cranial Nerves: II-XII grossly intact.  Skin:        No rashes, lesions or ulcers noted.               Ext:           No cyanosis or edema.      Assessment:  1. Obstructive sleep apnea     2. Insomnia, unspecified type     3. Former smoker     4. BMI 31.0-31.9,adult  Height And Weight   5. Centrilobular emphysema (HCC)         Immunizations:    Flu:declines  Pneumovax 23:2014  Prevnar 13:11/2015    Plan:  1.  Continue CPAP nightly.  DME mask/supplies.  2.  Discussed sleep hygiene.  3.  Encouraged weight loss.  4.  Follow-up  in 1 year with compliance card, sooner if needed.    Please note that this dictation was created using voice recognition software. I have made every reasonable attempt to correct obvious errors, but it is possible there are errors of grammar and possibly content that I did not discover before finalizing the note.

## 2019-06-05 ENCOUNTER — OFFICE VISIT (OUTPATIENT)
Dept: PULMONOLOGY | Facility: HOSPICE | Age: 79
End: 2019-06-05
Payer: MEDICARE

## 2019-06-05 ENCOUNTER — NON-PROVIDER VISIT (OUTPATIENT)
Dept: PULMONOLOGY | Facility: HOSPICE | Age: 79
End: 2019-06-05
Payer: MEDICARE

## 2019-06-05 VITALS
RESPIRATION RATE: 16 BRPM | SYSTOLIC BLOOD PRESSURE: 98 MMHG | DIASTOLIC BLOOD PRESSURE: 58 MMHG | HEART RATE: 63 BPM | HEIGHT: 69 IN | WEIGHT: 216 LBS | BODY MASS INDEX: 31.99 KG/M2 | OXYGEN SATURATION: 94 %

## 2019-06-05 VITALS — BODY MASS INDEX: 31.9 KG/M2 | WEIGHT: 216 LBS

## 2019-06-05 DIAGNOSIS — Z87.891 FORMER SMOKER: ICD-10-CM

## 2019-06-05 DIAGNOSIS — J43.2 CENTRILOBULAR EMPHYSEMA (HCC): Chronic | ICD-10-CM

## 2019-06-05 DIAGNOSIS — R91.8 PULMONARY NODULES: Chronic | ICD-10-CM

## 2019-06-05 PROCEDURE — 99214 OFFICE O/P EST MOD 30 MIN: CPT | Performed by: NURSE PRACTITIONER

## 2019-06-05 PROCEDURE — 94060 EVALUATION OF WHEEZING: CPT | Performed by: INTERNAL MEDICINE

## 2019-06-05 ASSESSMENT — PULMONARY FUNCTION TESTS
FEV1/FVC_PERCENT_PREDICTED: 66
FEV1: 2.06
FEV1_PERCENT_PREDICTED: 89
FEV1/FVC_PREDICTED: 70.55
FVC_PERCENT_PREDICTED: 128
FEV1/FVC: 47
FEV1: 2.16
FEV1_PERCENT_CHANGE: 0
FVC_PERCENT_PREDICTED: 127
FEV1_PREDICTED: 2.42
FEV1_PERCENT_CHANGE: 0
FVC_PREDICTED: 3.43
FEV1/FVC_PERCENT_CHANGE: 0
FEV1/FVC_PERCENT_PREDICTED: 70
FVC: 4.38
FEV1/FVC: 48.87
FVC: 4.42
FEV1_PREDICTED: 84

## 2019-06-05 NOTE — PROGRESS NOTES
emphysema and JEFF. Former smoker, quit 1998 with 25PYH. PFT 5/15/2017 indicates FVC 4.74 L or 135% predicted, FEV1 2.53 L or 100% predicted, FEV1/FVC ratio 53, %, DLCO 90% predicted. Patient underwent heart cath last year and started having weight loss. His dyspnea significanly improved and he stopped inhalers.  He started using beat juice daily as well which he contributes feeling better to. He feels great and is motivated to lose 10lbs. He denies cough, phlegm, chest pain or wheezing. Regular exercise makes his breathing better he states.     He has a history of an abnormal CT scan with pulmonary nodules. The nodules were stable for 2 years; last CT was 7/21/2009. CT scan at [facility] 2/23/16 indicates 8mm noncalcified pulmonary nodule in the right middle lobe and severe centrilobular emphysema. Addendum noted stability of nodule and no more imaging warranted. Results reviewed with patient.    Assessment:  1. Centrilobular emphysema (HCC)      2. Pulmonary nodules      3. Obstructive sleep apnea      4. Insomnia, unspecified type            Immunizations:     Flu:11/2017  Pneumovax 23:2014  Prevnar 13:11/2015     Plan: Emphysema and JEFF clinically stable. No change in treatment plan.  1. Continue RESHMA prn if needed.  2. PFT at next OV.  3. Continue CPAP w/O2 bleed in.  DME mask/supplies.  4. Encouraged weight loss.  5. Discussed sleep and respiratory hygiene.  6. Follow up in 1 year at pulmonary clinic with PFT, sooner if needed.  Follow up in 1 year at sleep center with compliance card, sooner if needed.

## 2019-06-05 NOTE — PROGRESS NOTES
"Chief Complaint   Patient presents with   • Follow-Up     Pulmonary Nodules, JEFF / Cipriano        HPI:  Danielito Tolliver is a 78 y.o. year old male here today for follow-up on emphysema.     PATIENT IS ALSO SLEEP PATIENT; SEE DICTATION 6/3/19.    Former smoker, quit 1998 with 25PYH.   PFT 5/15/2017 indicates FVC 4.74 L or 135% predicted, FEV1 2.53 L or 100% predicted, FEV1/FVC ratio 53, %, DLCO 90% predicted.   Spirometry 6/5/2019 indicates FEV1 2.06 L or 84%, FEV1/FVC ratio 47%, and mid flows at 37%.  Patient did a significant bronchodilator response.   Patient underwent heart cath 2017 and started having weight loss. His dyspnea significanly improved and he stopped inhalers.  He started using beat juice daily as well which he contributes feeling better to. He notes not being as active and gaining some weight back which has slowed him down. 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 He has been doing this x1 week. He denies significant dyspnea, cough, phlegm, chest tightness or wheezing.    He has a history of an abnormal CT scan with pulmonary nodules. The nodules were stable for 2 years; last CT was 7/21/2009. CT scan at [facility] 2/23/16 indicates 8mm noncalcified pulmonary nodule in the right middle lobe and severe centrilobular emphysema. Addendum noted stability of nodule and no more imaging warranted. Results reviewed with patient.    ROS: As per HPI and otherwise negative if not stated.    Past Medical History:   Diagnosis Date   • Breath shortness     Uses 02 @ 4L per nc at home prn    • CATARACT     IOL OU   • Cold     coughing up \"dirty\" sputum   • Dental disorder     Full Upper   • Emphysema of lung (HCC)    • Hypercholesteremia    • Indigestion    • Infection     left knee post TKA   • JEFF (obstructive sleep apnea)     AUTO CPAP 5-15CM WITH O2 4 LPM   • Pain     low back pain left side   • Psychiatric problem     takes med for depression   • Snoring     Uses CPAP " "      Past Surgical History:   Procedure Laterality Date   • KNEE ARTHROTOMY Left 3/24/2016    Procedure: KNEE ARTHROTOMY-HARDWARE REMOVAL AND HETEROTOPIC OSSIFICATION;  Surgeon: Yasmani Bradley M.D.;  Location: SURGERY [facility];  Service:    • OTHER ORTHOPEDIC SURGERY  3-21-16    \"Knee ReplacementSurgeries Left kneex3 Rightx2\"    • OTHER CARDIAC SURGERY  2013    \"Open Heart-Coronary Artery Bypass, Heart Stent\"   • BRONCHOSCOPY-ENDO  3/25/2009    Performed by SINCERE PABLO at SURGERY [facility]   • CATARACT EXTRACTION WITH IOL      B/L   • OTHER      TKA B/L       No family history on file.    Social History     Social History   • Marital status:      Spouse name: N/A   • Number of children: N/A   • Years of education: N/A     Occupational History   • Not on file.     Social History Main Topics   • Smoking status: Former Smoker     Packs/day: 1.00     Years: 25.00     Types: Cigarettes     Quit date: 1/1/1998   • Smokeless tobacco: Never Used   • Alcohol use No   • Drug use: No   • Sexual activity: Not on file     Other Topics Concern   • Not on file     Social History Narrative   • No narrative on file       Allergies as of 06/05/2019 - Reviewed 06/05/2019   Allergen Reaction Noted   • Morphine Anxiety 03/21/2016        @Vital signs for this encounter:  Vitals:    06/05/19 1252   Height: 1.753 m (5' 9\")   Weight: 98 kg (216 lb)   Weight % change since last entry.: 0 %   BP: (!) 98/58   Pulse: 63   BMI (Calculated): 31.9   Resp: 16   O2 sat % room air: 94 %       Current medications as of today   Current Outpatient Prescriptions   Medication Sig Dispense Refill   • Tiotropium Bromide Monohydrate (SPIRIVA RESPIMAT) 2.5 MCG/ACT Aero Soln Inhale  by mouth.     • BOOSTRIX 5-2.5-18.5 Suspension ADM 0.5ML IM UTD  0   • aspirin 81 MG tablet Take 81 mg by mouth every day.     • albuterol 108 (90 BASE) MCG/ACT Aero Soln inhalation aerosol Inhale 2 Puffs by mouth every 6 hours as needed for Shortness " of Breath. 8.5 g 5   • simvastatin (ZOCOR) 80 MG tablet Take 80 mg by mouth every day.     • Multiple Vitamins-Minerals (OCUVITE PO) Take 1 Cap by mouth every day.     • Multiple Vitamins-Minerals (CENTRUM SILVER ADULT 50+ PO) Take 1 Tab by mouth every day.     • Omega-3 Fatty Acids (FISH OIL PO) Take 1 Cap by mouth every day.     • Coenzyme Q10 (COQ-10) 100 MG Cap Take 1 Cap by mouth every day.     • Ascorbic Acid (VITAMIN C PO) Take 500 mg by mouth every day.     • lisinopril (PRINIVIL) 5 MG Tab Take 5 mg by mouth every day.     • Cobalamine Combinations (VITAMIN B12-FOLIC ACID PO) Take 1 Tab by mouth every day.     • CEPHALEXIN 500 MG PO CAPS Take 1,000 mg by mouth every day. For an infection in the leg for 7 years.       No current facility-administered medications for this visit.          Physical Exam:   Gen:           Alert and oriented, No apparent distress. Mood and affect appropriate, normal interaction with examiner.  Eyes:          PERRL, EOM intact, sclere white, conjunctive moist.  Ears:          Hearing aides; left ear normal; right ear wax in canal.  Hearing:     Grossly intact.  Nose:          Normal, no lesions or deformities.  Dentition:    Good dentition.  Oropharynx:   Tongue normal, posterior pharynx without erythema or exudate.  Mallampati Classification: 3  Neck:        Supple, trachea midline, no masses.  Respiratory Effort: No intercostal retractions or use of accessory muscles.   Lung Auscultation:      Clear to auscultation bilaterally but diminished t/o; no rales, rhonchi or wheezing.  CV:            Regular rate and rhythm. No murmurs, rubs or gallops.  Abd:           Not examined.   Lymphadenopathy: Not examined.  Gait and Station: Normal.  Digits and Nails: No clubbing, cyanosis, petechiae, or nodes.   Cranial Nerves: II-XII grossly intact.  Skin:        No rashes, lesions or ulcers noted.               Ext:           No cyanosis or edema.      Assessment:  1. Centrilobular emphysema  (HCC)     2. Pulmonary nodules     3. Former smoker     4. BMI 31.0-31.9,adult  Height And Weight       Immunizations:    Flu:not obtained  Pneumovax 23:2014  Prevnar 13:11/2015    Plan:  1.  Patient notes his dyspnea to be minimal but admits that his lung function is not as good as it could be.  He recently restarted exercising on a daily basis and becoming more busy on the farm.  He would like to increase his current activity and reassess his symptoms.  He like to hold off on bronchodilators at this time.  He does have an albuterol HFA inhaler at home but is not felt the need to use it.  2.  Discussed pressure hygiene.  3.  Encourage weight loss.  4.  Follow-up in 6 months with spirometry to check symptoms, sooner if needed.  Advised patient that if spirometry indicates further decline we will initiate bronchodilators.  Follow-up at sleep center as scheduled.    Please note that this dictation was created using voice recognition software. I have made every reasonable attempt to correct obvious errors, but it is possible there are errors of grammar and possibly content that I did not discover before finalizing the note.

## 2019-06-05 NOTE — PROCEDURES
FEV1 is 2.06 L which is 84% of predicted.  FEV1 FVC ratio is significantly reduced at 47%.  After administration of an inhaled bronchodilator there is a 5% improvement in FEV1.    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  No significant reversibility is noted on the study.

## 2019-06-05 NOTE — LETTER
ANA Garcia.  [facility] Pulmonary Medicine   [address] [place] [address] [place] [address]  Phone: [phone] - Fax: [phone]           Encounter Date: 6/5/2019  Provider: ERLIN Garcia  Location of Care: [facility] PULMONARY MEDICINE      Patient:   Danielito Tolliver   MR Number: [id number]   YOB: 1940     PROGRESS NOTE:  Chief Complaint   Patient presents with   • Follow-Up     Pulmonary Nodules, JEFF / Cipriano        HPI:  Danielito Tolliver is a 78 y.o. year old male here today for follow-up on emphysema.     PATIENT IS ALSO SLEEP PATIENT; SEE DICTATION 6/3/19.    Former smoker, quit 1998 with 25PYH.   PFT 5/15/2017 indicates FVC 4.74 L or 135% predicted, FEV1 2.53 L or 100% predicted, FEV1/FVC ratio 53, %, DLCO 90% predicted.   Spirometry 6/5/2019 indicates FEV1 2.06 L or 84%, FEV1/FVC ratio 47%, and mid flows at 37%.  Patient did a significant bronchodilator response.   Patient underwent heart cath 2017 and started having weight loss. His dyspnea significanly improved and he stopped inhalers.  He started using beat juice daily as well which he contributes feeling better to. He notes not being as active and gaining some weight back which has slowed him down. 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 He has been doing this x1 week. He denies significant dyspnea, cough, phlegm, chest tightness or wheezing.    He has a history of an abnormal CT scan with pulmonary nodules. The nodules were stable for 2 years; last CT was 7/21/2009. CT scan at [facility] 2/23/16 indicates 8mm noncalcified pulmonary nodule in the right middle lobe and severe centrilobular emphysema. Addendum noted stability of nodule and no more imaging warranted. Results reviewed with patient.    ROS: As per HPI and otherwise negative if not stated.    Past Medical History:   Diagnosis Date   • Breath shortness     Uses 02 @ 4L per nc at  "home prn    • CATARACT     IOL OU   • Cold     coughing up \"dirty\" sputum   • Dental disorder     Full Upper   • Emphysema of lung (HCC)    • Hypercholesteremia    • Indigestion    • Infection     left knee post TKA   • JEFF (obstructive sleep apnea)     AUTO CPAP 5-15CM WITH O2 4 LPM   • Pain     low back pain left side   • Psychiatric problem     takes med for depression   • Snoring     Uses CPAP       Past Surgical History:   Procedure Laterality Date   • KNEE ARTHROTOMY Left 3/24/2016    Procedure: KNEE ARTHROTOMY-HARDWARE REMOVAL AND HETEROTOPIC OSSIFICATION;  Surgeon: Yasmani Bradley M.D.;  Location: SURGERY [facility];  Service:    • OTHER ORTHOPEDIC SURGERY  3-21-16    \"Knee ReplacementSurgeries Left kneex3 Rightx2\"    • OTHER CARDIAC SURGERY  2013    \"Open Heart-Coronary Artery Bypass, Heart Stent\"   • BRONCHOSCOPY-ENDO  3/25/2009    Performed by SINCERE PABLO at SURGERY [facility]   • CATARACT EXTRACTION WITH IOL      B/L   • OTHER      TKA B/L       No family history on file.    Social History     Social History   • Marital status:      Spouse name: N/A   • Number of children: N/A   • Years of education: N/A     Occupational History   • Not on file.     Social History Main Topics   • Smoking status: Former Smoker     Packs/day: 1.00     Years: 25.00     Types: Cigarettes     Quit date: 1/1/1998   • Smokeless tobacco: Never Used   • Alcohol use No   • Drug use: No   • Sexual activity: Not on file     Other Topics Concern   • Not on file     Social History Narrative   • No narrative on file       Allergies as of 06/05/2019 - Reviewed 06/05/2019   Allergen Reaction Noted   • Morphine Anxiety 03/21/2016        @Vital signs for this encounter:  Vitals:    06/05/19 1252   Height: 1.753 m (5' 9\")   Weight: 98 kg (216 lb)   Weight % change since last entry.: 0 %   BP: (!) 98/58   Pulse: 63   BMI (Calculated): 31.9   Resp: 16   O2 sat % room air: 94 %       Current medications as of today   "   Current Outpatient Prescriptions   Medication Sig Dispense Refill   • Tiotropium Bromide Monohydrate (SPIRIVA RESPIMAT) 2.5 MCG/ACT Aero Soln Inhale  by mouth.     • BOOSTRIX 5-2.5-18.5 Suspension ADM 0.5ML IM UTD  0   • aspirin 81 MG tablet Take 81 mg by mouth every day.     • albuterol 108 (90 BASE) MCG/ACT Aero Soln inhalation aerosol Inhale 2 Puffs by mouth every 6 hours as needed for Shortness of Breath. 8.5 g 5   • simvastatin (ZOCOR) 80 MG tablet Take 80 mg by mouth every day.     • Multiple Vitamins-Minerals (OCUVITE PO) Take 1 Cap by mouth every day.     • Multiple Vitamins-Minerals (CENTRUM SILVER ADULT 50+ PO) Take 1 Tab by mouth every day.     • Omega-3 Fatty Acids (FISH OIL PO) Take 1 Cap by mouth every day.     • Coenzyme Q10 (COQ-10) 100 MG Cap Take 1 Cap by mouth every day.     • Ascorbic Acid (VITAMIN C PO) Take 500 mg by mouth every day.     • lisinopril (PRINIVIL) 5 MG Tab Take 5 mg by mouth every day.     • Cobalamine Combinations (VITAMIN B12-FOLIC ACID PO) Take 1 Tab by mouth every day.     • CEPHALEXIN 500 MG PO CAPS Take 1,000 mg by mouth every day. For an infection in the leg for 7 years.       No current facility-administered medications for this visit.          Physical Exam:   Gen:           Alert and oriented, No apparent distress. Mood and affect appropriate, normal interaction with examiner.  Eyes:          PERRL, EOM intact, sclere white, conjunctive moist.  Ears:          Hearing aides; left ear normal; right ear wax in canal.  Hearing:     Grossly intact.  Nose:          Normal, no lesions or deformities.  Dentition:    Good dentition.  Oropharynx:   Tongue normal, posterior pharynx without erythema or exudate.  Mallampati Classification: 3  Neck:        Supple, trachea midline, no masses.  Respiratory Effort: No intercostal retractions or use of accessory muscles.   Lung Auscultation:      Clear to auscultation bilaterally but diminished t/o; no rales, rhonchi or wheezing.  CV:             Regular rate and rhythm. No murmurs, rubs or gallops.  Abd:           Not examined.   Lymphadenopathy: Not examined.  Gait and Station: Normal.  Digits and Nails: No clubbing, cyanosis, petechiae, or nodes.   Cranial Nerves: II-XII grossly intact.  Skin:        No rashes, lesions or ulcers noted.               Ext:           No cyanosis or edema.      Assessment:  1. Centrilobular emphysema (HCC)     2. Pulmonary nodules     3. Former smoker     4. BMI 31.0-31.9,adult  Height And Weight       Immunizations:    Flu:not obtained  Pneumovax 23:2014  Prevnar 13:11/2015    Plan:  1.  Patient notes his dyspnea to be minimal but admits that his lung function is not as good as it could be.  He recently restarted exercising on a daily basis and becoming more busy on the farm.  He would like to increase his current activity and reassess his symptoms.  He like to hold off on bronchodilators at this time.  He does have an albuterol HFA inhaler at home but is not felt the need to use it.  2.  Discussed pressure hygiene.  3.  Encourage weight loss.  4.  Follow-up in 6 months with spirometry to check symptoms, sooner if needed.  Advised patient that if spirometry indicates further decline we will initiate bronchodilators.  Follow-up at sleep center as scheduled.    Please note that this dictation was created using voice recognition software. I have made every reasonable attempt to correct obvious errors, but it is possible there are errors of grammar and possibly content that I did not discover before finalizing the note.        Electronically signed by LOUIE GarciaRViniciusNVinicius  on 06/05/19    BEVERLY Crouch M.D.  [address] [place] [address]  VIA Facsimile: [phone]

## 2019-10-29 NOTE — PROGRESS NOTES
Last OV 6/5/19    emphysema.      PATIENT IS ALSO SLEEP PATIENT; SEE DICTATION 6/3/19.     Former smoker, quit 1998 with 25PYH.   PFT 5/15/2017 indicates FVC 4.74 L or 135% predicted, FEV1 2.53 L or 100% predicted, FEV1/FVC ratio 53, %, DLCO 90% predicted.   Spirometry 6/5/2019 indicates FEV1 2.06 L or 84%, FEV1/FVC ratio 47%, and mid flows at 37%.  Patient did a significant bronchodilator response.   Patient underwent heart cath 2017 and started having weight loss. His dyspnea significanly improved and he stopped inhalers.  He started using beat juice daily as well which he contributes feeling better to. He notes not being as active and gaining some weight back which has slowed him down. 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 He has been doing this x1 week. He denies significant dyspnea, cough, phlegm, chest tightness or wheezing.     He has a history of an abnormal CT scan with pulmonary nodules. The nodules were stable for 2 years; last CT was 7/21/2009. CT scan at [facility] 2/23/16 indicates 8mm noncalcified pulmonary nodule in the right middle lobe and severe centrilobular emphysema. Addendum noted stability of nodule and no more imaging warranted. Results reviewed with patient.    Assessment:  1. Centrilobular emphysema (HCC)      2. Pulmonary nodules      3. Former smoker      4. BMI 31.0-31.9,adult  Height And Weight         Immunizations:     Flu:not obtained  Pneumovax 23:2014  Prevnar 13:11/2015     Plan:  1.  Patient notes his dyspnea to be minimal but admits that his lung function is not as good as it could be.  He recently restarted exercising on a daily basis and becoming more busy on the farm.  He would like to increase his current activity and reassess his symptoms.  He like to hold off on bronchodilators at this time.  He does have an albuterol HFA inhaler at home but is not felt the need to use it.  2.  Discussed pressure hygiene.  3.  Encourage weight loss.  4.   Follow-up in 6 months with spirometry to check symptoms, sooner if needed.  Advised patient that if spirometry indicates further decline we will initiate bronchodilators.  Follow-up at sleep center as scheduled.

## 2019-10-30 ENCOUNTER — APPOINTMENT (OUTPATIENT)
Dept: PULMONOLOGY | Facility: HOSPICE | Age: 79
End: 2019-10-30
Payer: MEDICARE

## 2019-11-06 ENCOUNTER — NON-PROVIDER VISIT (OUTPATIENT)
Dept: PULMONOLOGY | Facility: HOSPICE | Age: 79
End: 2019-11-06
Payer: MEDICARE

## 2019-11-06 ENCOUNTER — OFFICE VISIT (OUTPATIENT)
Dept: PULMONOLOGY | Facility: HOSPICE | Age: 79
End: 2019-11-06
Payer: MEDICARE

## 2019-11-06 VITALS
DIASTOLIC BLOOD PRESSURE: 70 MMHG | OXYGEN SATURATION: 93 % | WEIGHT: 213 LBS | RESPIRATION RATE: 16 BRPM | HEIGHT: 69 IN | BODY MASS INDEX: 31.55 KG/M2 | HEART RATE: 56 BPM | SYSTOLIC BLOOD PRESSURE: 130 MMHG

## 2019-11-06 DIAGNOSIS — Z87.891 FORMER SMOKER: ICD-10-CM

## 2019-11-06 DIAGNOSIS — J43.2 CENTRILOBULAR EMPHYSEMA (HCC): Chronic | ICD-10-CM

## 2019-11-06 DIAGNOSIS — R91.8 PULMONARY NODULES: Chronic | ICD-10-CM

## 2019-11-06 PROCEDURE — 99214 OFFICE O/P EST MOD 30 MIN: CPT | Performed by: NURSE PRACTITIONER

## 2019-11-06 PROCEDURE — 94060 EVALUATION OF WHEEZING: CPT | Performed by: INTERNAL MEDICINE

## 2019-11-06 ASSESSMENT — PULMONARY FUNCTION TESTS
FEV1/FVC_PERCENT_PREDICTED: 76
FEV1_PREDICTED: 2.41
FVC_PERCENT_PREDICTED: 115
FEV1/FVC_PERCENT_PREDICTED: 72
FVC: 3.93
FEV1_PREDICTED: 77
FVC: 3.68
FVC_PREDICTED: 3.42
FEV1_PERCENT_PREDICTED: 87
FEV1: 1.87
FEV1_PERCENT_CHANGE: 12
FEV1/FVC: 51
FEV1/FVC: 53.69
FEV1_PERCENT_CHANGE: 6
FEV1: 2.11
FVC_PERCENT_PREDICTED: 107
FEV1/FVC_PERCENT_CHANGE: 200
FEV1/FVC_PREDICTED: 70.47

## 2019-11-06 NOTE — PROGRESS NOTES
Chief Complaint   Patient presents with   • Follow-Up     Cipriano        HPI:  Danielito Tolliver is a 79 y.o. year old male here today for follow-up on emphysema.     PATIENT IS ALSO SLEEP PATIENT; SEE DICTATION 6/3/19.     Former smoker, quit 1998 with 25PYH.   PFT 5/15/2017 indicates FVC 4.74 L or 135% predicted, FEV1 2.53 L or 100% predicted, FEV1/FVC ratio 53, %, DLCO 90% predicted.   Spirometry 6/5/2019 indicates FEV1 2.06 L or 84%, FEV1/FVC ratio 47%, and mid flows at 37%.  Patient did a significant bronchodilator response.  [place] 11/6/2019 shows further decline with FEV1 1.87 L or 77%, FEV1/FVC ratio 51%.  Patient did have a significant bronchodilator response.  I reviewed finds with patient.  Patient underwent heart cath 2017 and started having weight loss. His dyspnea significanly improved and he stopped inhalers.    He continues to perform breathing exercises that he learned from cardiac rehab.  He notes using a stationary bike for 30 minutes and deep breathing resolves dyspnea.  He notes not using his stationary bike on a regular basis but is motivated to attempt this.  He notes becoming more sedentary and age due to overall frustration of limitations of getting older and the types of work he does.  He continues to use beet juice which he feels improves his health.  He does get out of breath with certain activities but denies chest pain or chest tightness.  He notes some increased cough and congestion of recent.  Sometimes he has difficulty with a productive cough but with deep breathing exercises this resolves.  He would benefit from restarting bronchodilator therapy.  He is amenable to this.     He has a history of an abnormal CT scan with pulmonary nodules. The nodules were stable for 2 years; last CT was 7/21/2009. CT scan at [facility] 2/23/16 indicates 8mm noncalcified pulmonary nodule in the right middle lobe and severe centrilobular emphysema. Addendum noted stability of nodule and no more imaging  "warranted. Results reviewed with patient.      ROS: As per HPI and otherwise negative if not stated.    Past Medical History:   Diagnosis Date   • Breath shortness     Uses 02 @ 4L per nc at home prn    • CATARACT     IOL OU   • Cold     coughing up \"dirty\" sputum   • Dental disorder     Full Upper   • Emphysema of lung (HCC)    • Hypercholesteremia    • Indigestion    • Infection     left knee post TKA   • JEFF (obstructive sleep apnea)     AUTO CPAP 5-15CM WITH O2 4 LPM   • Pain     low back pain left side   • Psychiatric problem     takes med for depression   • Snoring     Uses CPAP       Past Surgical History:   Procedure Laterality Date   • KNEE ARTHROTOMY Left 3/24/2016    Procedure: KNEE ARTHROTOMY-HARDWARE REMOVAL AND HETEROTOPIC OSSIFICATION;  Surgeon: Yasmani Bradley M.D.;  Location: SURGERY [facility];  Service:    • OTHER ORTHOPEDIC SURGERY  3-21-16    \"Knee ReplacementSurgeries Left kneex3 Rightx2\"    • OTHER CARDIAC SURGERY      \"Open Heart-Coronary Artery Bypass, Heart Stent\"   • BRONCHOSCOPY-ENDO  3/25/2009    Performed by SINCERE PABLO at SURGERY [facility]   • CATARACT EXTRACTION WITH IOL      B/L   • OTHER      TKA B/L       History reviewed. No pertinent family history.    Social History     Socioeconomic History   • Marital status:      Spouse name: Not on file   • Number of children: Not on file   • Years of education: Not on file   • Highest education level: Not on file   Occupational History   • Not on file   Social Needs   • Financial resource strain: Not on file   • Food insecurity:     Worry: Not on file     Inability: Not on file   • Transportation needs:     Medical: Not on file     Non-medical: Not on file   Tobacco Use   • Smoking status: Former Smoker     Packs/day: 1.00     Years: 25.00     Pack years: 25.00     Types: Cigarettes     Last attempt to quit: 1998     Years since quittin.8   • Smokeless tobacco: Never Used   Substance and Sexual " "Activity   • Alcohol use: No     Alcohol/week: 0.0 oz   • Drug use: No   • Sexual activity: Not on file   Lifestyle   • Physical activity:     Days per week: Not on file     Minutes per session: Not on file   • Stress: Not on file   Relationships   • Social connections:     Talks on phone: Not on file     Gets together: Not on file     Attends Latter-day service: Not on file     Active member of club or organization: Not on file     Attends meetings of clubs or organizations: Not on file     Relationship status: Not on file   • Intimate partner violence:     Fear of current or ex partner: Not on file     Emotionally abused: Not on file     Physically abused: Not on file     Forced sexual activity: Not on file   Other Topics Concern   • Not on file   Social History Narrative   • Not on file       Allergies as of 11/06/2019 - Reviewed 11/06/2019   Allergen Reaction Noted   • Morphine Anxiety 03/21/2016        Vitals:  /70   Pulse (!) 56   Resp 16   Ht 1.753 m (5' 9\")   Wt 96.6 kg (213 lb)   SpO2 93%     Current medications as of today   Current Outpatient Medications   Medication Sig Dispense Refill   • Tiotropium Bromide Monohydrate (SPIRIVA RESPIMAT) 2.5 MCG/ACT Aero Soln Inhale  by mouth.     • BOOSTRIX 5-2.5-18.5 Suspension ADM 0.5ML IM UTD  0   • aspirin 81 MG tablet Take 81 mg by mouth every day.     • albuterol 108 (90 BASE) MCG/ACT Aero Soln inhalation aerosol Inhale 2 Puffs by mouth every 6 hours as needed for Shortness of Breath. 8.5 g 5   • simvastatin (ZOCOR) 80 MG tablet Take 80 mg by mouth every day.     • Multiple Vitamins-Minerals (OCUVITE PO) Take 1 Cap by mouth every day.     • Multiple Vitamins-Minerals (CENTRUM SILVER ADULT 50+ PO) Take 1 Tab by mouth every day.     • Omega-3 Fatty Acids (FISH OIL PO) Take 1 Cap by mouth every day.     • Coenzyme Q10 (COQ-10) 100 MG Cap Take 1 Cap by mouth every day.     • Ascorbic Acid (VITAMIN C PO) Take 500 mg by mouth every day.     • lisinopril " (PRINIVIL) 5 MG Tab Take 5 mg by mouth every day.     • Cobalamine Combinations (VITAMIN B12-FOLIC ACID PO) Take 1 Tab by mouth every day.     • CEPHALEXIN 500 MG PO CAPS Take 1,000 mg by mouth every day. For an infection in the leg for 7 years.       No current facility-administered medications for this visit.          Physical Exam:   Gen:           Alert and oriented, No apparent distress. Mood and affect appropriate, normal interaction with examiner.  Eyes:          PERRL, EOM intact, sclere white, conjunctive moist.  Ears:          No lesions or deformities. Wax to right ear canal.  Hearing:     Grossly intact.  Nose:          Normal, no lesions or deformities.  Dentition:    Good dentition.  Oropharynx:   Tongue normal.  Mallampati Classification: 2/3  Neck:        Supple, trachea midline, no masses.  Respiratory Effort: No intercostal retractions or use of accessory muscles.   Lung Auscultation:      Clear to auscultation bilaterally; no rales, rhonchi or wheezing.  CV:            Regular rate and rhythm. No murmurs, rubs or gallops.  Abd:           Not examined.  Lymphadenopathy: Not examined.  Gait and Station: Normal.  Digits and Nails: No clubbing, cyanosis, petechiae, or nodes.   Cranial Nerves: II-XII grossly intact.  Skin:        No rashes, lesions or ulcers noted.               Ext:           No cyanosis or edema.      Assessment:  1. Centrilobular emphysema (HCC)     2. Pulmonary nodules     3. Former smoker     4. BMI 31.0-31.9,adult  Height And Weight       Immunizations:    Flu:will obtain at his local pharmacy  Pneumovax 23:6/2014  Prevnar 13:11/15    Plan:  1.  Patient's respiratory status is clinically stable but it could be optimized.  He will restart Spiriva Respimat 2.5 mcg 2 puffs once daily.  Encouraged to restart stationary bike exercise for 30 minutes daily to improve stamina.  2.  Continue nocturnal oxygen therapy.  3.  Encourage routine activity.  Encourage weight loss.  4.  Follow-up  in 6 months with spirometry to check symptoms, sooner if needed.    Please note that this dictation was created using voice recognition software. I have made every reasonable attempt to correct obvious errors, but it is possible there are errors of grammar and possibly content that I did not discover before finalizing the note.

## 2019-11-06 NOTE — PROCEDURES
Good patient effort & cooperation. Test was performed on the [employer] Body Plethysmograph-Elite DX system. The predicted sets used for Spirometry are NHanes III, for Lung Volumes are ITS. The results of this test meet the ATS standards for acceptability and repeatability. A bronchodilator of Ventolin HFA- 2puff via spacer was administered.    This is a pre-bronchodilators and postbronchodilator spirometry only      FVC was 3.93 L, 115% of predicted.  FEV1 was 2.11 L, 87% of predicted.  FEV1/FVC ratio was 54%.  There was good response of bronchodilators with FEV1 increased by 12%.  Flow volume loop was consistent with obstruction.    Impression:  The patient has mild obstructive ventilatory defect probably secondary to COPD, given the patient extensive smoking history.  Clinical correlation is required

## 2020-02-20 ENCOUNTER — TELEPHONE (OUTPATIENT)
Dept: PULMONOLOGY | Facility: HOSPICE | Age: 80
End: 2020-02-20

## 2020-02-20 DIAGNOSIS — J43.9 PULMONARY EMPHYSEMA, UNSPECIFIED EMPHYSEMA TYPE (HCC): ICD-10-CM

## 2020-02-20 DIAGNOSIS — Z01.818 PREOP TESTING: ICD-10-CM

## 2020-02-20 DIAGNOSIS — R94.2 ABNORMAL RESULTS OF PULMONARY FUNCTION STUDIES: ICD-10-CM

## 2020-02-20 DIAGNOSIS — T81.82XS SUBCUTANEOUS EMPHYSEMA RESULTING FROM A PROCEDURE, SEQUELA: ICD-10-CM

## 2020-02-20 NOTE — TELEPHONE ENCOUNTER
Recommend updated CXR and PFT now for clearance.  Also, patient was noted to have mild obstruction on last breathing test, if undergoing anesthesia, could benefit from starting inhaler such as Anoro 1 puff daily now to optimize breathing prior to procedure.    Pending results, may lucia clearance.

## 2020-02-20 NOTE — TELEPHONE ENCOUNTER
1. Caller Name: Danielito Tolliver                 Call Back Number: [phone] (home)       Patient approves a detailed voicemail message: N\A    2.  What is the procedure: EGD and Colonoscopy with Deep (Propafol) Sedation    3.  When is the procedure scheduled: 3/11/2020    4.  Who is the Surgeon: Dr. Alfredo Sinha    5. Has the patient completed any screening tests for the procedure: NO    6. Has [facility] received a written request from Surgeon: yes - scanned in media     7. Patient scheduled for an appointment for this clearance?:  no    8. Last OV: 11/6/19 LEILANI Martin APRN     9. Next OV: 4/29/2020 LEILANI Martin APRN    10. Last CXR: 6/11/207    11. Last PFT: 11/6/19    12. Last CT 2/23/16    Current Medications  Current Outpatient Medications   Medication Sig Dispense Refill   • Tiotropium Bromide Monohydrate (SPIRIVA RESPIMAT) 2.5 MCG/ACT Aero Soln Inhale 2 Puffs by mouth every day. 1 Inhaler 11   • BOOSTRIX 5-2.5-18.5 Suspension ADM 0.5ML IM UTD  0   • aspirin 81 MG tablet Take 81 mg by mouth every day.     • albuterol 108 (90 BASE) MCG/ACT Aero Soln inhalation aerosol Inhale 2 Puffs by mouth every 6 hours as needed for Shortness of Breath. 8.5 g 5   • simvastatin (ZOCOR) 80 MG tablet Take 80 mg by mouth every day.     • Multiple Vitamins-Minerals (OCUVITE PO) Take 1 Cap by mouth every day.     • Multiple Vitamins-Minerals (CENTRUM SILVER ADULT 50+ PO) Take 1 Tab by mouth every day.     • Omega-3 Fatty Acids (FISH OIL PO) Take 1 Cap by mouth every day.     • Coenzyme Q10 (COQ-10) 100 MG Cap Take 1 Cap by mouth every day.     • Ascorbic Acid (VITAMIN C PO) Take 500 mg by mouth every day.     • lisinopril (PRINIVIL) 5 MG Tab Take 5 mg by mouth every day.     • Cobalamine Combinations (VITAMIN B12-FOLIC ACID PO) Take 1 Tab by mouth every day.     • CEPHALEXIN 500 MG PO CAPS Take 1,000 mg by mouth every day. For an infection in the leg for 7 years.       No current facility-administered medications for this  visit.        Please advise.

## 2020-02-21 NOTE — TELEPHONE ENCOUNTER
CXR order mailed to pt to complete. And PFT scheduled for 2/25/2020. Pt made aware of time date and location.

## 2020-02-25 ENCOUNTER — APPOINTMENT (OUTPATIENT)
Dept: PULMONOLOGY | Facility: HOSPICE | Age: 80
End: 2020-02-25
Attending: NURSE PRACTITIONER
Payer: COMMERCIAL

## 2020-02-25 ENCOUNTER — TELEPHONE (OUTPATIENT)
Dept: PULMONOLOGY | Facility: HOSPICE | Age: 80
End: 2020-02-25

## 2020-02-25 ENCOUNTER — HOSPITAL ENCOUNTER (OUTPATIENT)
Dept: RADIOLOGY | Facility: MEDICAL CENTER | Age: 80
End: 2020-02-25
Attending: NURSE PRACTITIONER
Payer: COMMERCIAL

## 2020-02-25 VITALS — BODY MASS INDEX: 31.75 KG/M2 | WEIGHT: 215 LBS

## 2020-02-25 DIAGNOSIS — J43.9 PULMONARY EMPHYSEMA, UNSPECIFIED EMPHYSEMA TYPE (HCC): ICD-10-CM

## 2020-02-25 DIAGNOSIS — R94.2 ABNORMAL RESULTS OF PULMONARY FUNCTION STUDIES: ICD-10-CM

## 2020-02-25 DIAGNOSIS — Z01.818 PREOP TESTING: ICD-10-CM

## 2020-02-25 PROCEDURE — 94726 PLETHYSMOGRAPHY LUNG VOLUMES: CPT | Performed by: INTERNAL MEDICINE

## 2020-02-25 PROCEDURE — 94729 DIFFUSING CAPACITY: CPT | Performed by: INTERNAL MEDICINE

## 2020-02-25 PROCEDURE — 94060 EVALUATION OF WHEEZING: CPT | Performed by: INTERNAL MEDICINE

## 2020-02-25 PROCEDURE — 71046 X-RAY EXAM CHEST 2 VIEWS: CPT

## 2020-02-25 ASSESSMENT — PULMONARY FUNCTION TESTS
FEV1/FVC_PERCENT_PREDICTED: 62
FVC: 4.17
FEV1: 1.95
FEV1/FVC_PERCENT_PREDICTED: 61
FEV1/FVC: 46.76
FEV1_PERCENT_PREDICTED: 76
FEV1: 2.16
FEV1/FVC_PERCENT_LLN: 63
FVC: 4.39
FEV1/FVC_PERCENT_PREDICTED: 65
FVC_PERCENT_PREDICTED: 122
FEV1_PERCENT_CHANGE: -9
FEV1_PREDICTED: 2.55
FEV1_LLN: 2.13
FEV1/FVC_PREDICTED: 75
FEV1/FVC_PERCENT_CHANGE: -5
FEV1/FVC: 47
FVC_PREDICTED: 3.41
FEV1/FVC_PERCENT_CHANGE: 180
FEV1_PERCENT_PREDICTED: 84
FVC_LLN: 2.84
FEV1/FVC: 49
FEV1/FVC_PERCENT_PREDICTED: 75
FEV1_PERCENT_CHANGE: -5
FEV1/FVC: 49
FEV1/FVC_PERCENT_PREDICTED: 65
FVC_PERCENT_PREDICTED: 129

## 2020-02-25 NOTE — PROCEDURES
Technician: MO Mcknight    Technician Comment:  Good patient effort & cooperation.  The results of this test meet the ATS/ERS standards for acceptability & reproducibility.  Test was performed on the [employer] Body Plethysmograph-Elite DX system.  Predicted values were GLI-2012 for spirometry, GLI-2017 for DLCO, ITS for Lung Volumes.  The DLCO was uncorrected for Hgb.  A bronchodilator of Ventolin HFA -2puffs via spacer administered.  DLCO performed during dilation period.    The FVC is 4.12 L 122%, FEV1 is 1.95 L or 76%, FEV1/FVC: 47%.  Lung capacity: 114%.  DLCO: 84%.  No significant bronchodilator response.      Interpretation:  PFTs are consistent with moderate obstructive ventilatory defect (COPD).  Normal lung volumes and gas transfer.  Lack of bronchodilator response does not preclude using inhalers when clinically indicated.

## 2020-02-26 NOTE — TELEPHONE ENCOUNTER
Surgical clearance completed.  Please fax completed form with CXR/PFT and most recent progress note.

## 2020-02-26 NOTE — TELEPHONE ENCOUNTER
Surgical Clearance, CXR, PFT interp and raw data faxed to [facility]. Signed copy scanned into media

## 2020-07-13 ENCOUNTER — SLEEP CENTER VISIT (OUTPATIENT)
Dept: SLEEP MEDICINE | Facility: MEDICAL CENTER | Age: 80
End: 2020-07-13
Payer: COMMERCIAL

## 2020-07-13 VITALS
BODY MASS INDEX: 32.88 KG/M2 | DIASTOLIC BLOOD PRESSURE: 72 MMHG | OXYGEN SATURATION: 94 % | SYSTOLIC BLOOD PRESSURE: 130 MMHG | HEART RATE: 59 BPM | HEIGHT: 69 IN | RESPIRATION RATE: 16 BRPM | WEIGHT: 222 LBS

## 2020-07-13 DIAGNOSIS — Z87.891 FORMER SMOKER: ICD-10-CM

## 2020-07-13 DIAGNOSIS — G47.00 INSOMNIA, UNSPECIFIED TYPE: ICD-10-CM

## 2020-07-13 DIAGNOSIS — G47.33 OBSTRUCTIVE SLEEP APNEA: Chronic | ICD-10-CM

## 2020-07-13 DIAGNOSIS — J43.2 CENTRILOBULAR EMPHYSEMA (HCC): Chronic | ICD-10-CM

## 2020-07-13 PROCEDURE — 99214 OFFICE O/P EST MOD 30 MIN: CPT | Performed by: NURSE PRACTITIONER

## 2020-07-13 NOTE — PROGRESS NOTES
"Chief Complaint   Patient presents with   • Apnea     Last Seen 6/3/19       HPI:  Danielito Tolliver is a 79 y.o. year old male here today for follow-up on JEFF.  Last OV 6/2019.    PATIENT IS ALSO PULMONARY PATIENT; SEE DICTATION 11/6/19 BY LEILANI SOMMER.     HST 2013 indicated an overall AHI of 15 with a minimum oxygen saturation of 77%.  Patient currently uses auto CPAP 5-15cm H2O with 4 L oxygen bleed in.  CNOX 10/17/2015 indicated normal oximetry on that setting.  Compliance download 6/13/2020-7/12/2020 indicates 100% compliance, avg nightly use of 6hr 24min, mean pressure 6.2cm, no significant mask leak with reduced AHi 0.8/hr. I reviewed findings with patient. He tolerates mask and pressure well. He sleeps well. He denies AM headaches. He admits to gaining weight and not exercising regularly. He denies dyspnea at night.   He notes dyspnea with exertion which improves with regular exercise. He denies regular cough/phlegm or wheezing. No colds or exacerbations. He continues to work on farm. No pedal edema.  He notes drinking beet juice regularly has also helped him with his general health and how he feels.  We reviewed PFT 2/2020 and he has had some reduction in FEV1 and FEV1/FVC ratio.         ROS: As per HPI and otherwise negative if not stated.    Past Medical History:   Diagnosis Date   • Breath shortness     Uses 02 @ 4L per nc at home prn    • CATARACT     IOL OU   • Cold     coughing up \"dirty\" sputum   • Dental disorder     Full Upper   • Emphysema of lung (HCC)    • Hypercholesteremia    • Indigestion    • Infection     left knee post TKA   • JEFF (obstructive sleep apnea)     AUTO CPAP 5-15CM WITH O2 4 LPM   • Pain     low back pain left side   • Psychiatric problem     takes med for depression   • Snoring     Uses CPAP       Past Surgical History:   Procedure Laterality Date   • KNEE ARTHROTOMY Left 3/24/2016    Procedure: KNEE ARTHROTOMY-HARDWARE REMOVAL AND HETEROTOPIC OSSIFICATION;  Surgeon: Yasmani IBRAHIM" "MALDONDAO Bradley;  Location: SURGERY [facility];  Service:    • OTHER ORTHOPEDIC SURGERY  3-21-16    \"Knee ReplacementSurgeries Left kneex3 Rightx2\"    • OTHER CARDIAC SURGERY      \"Open Heart-Coronary Artery Bypass, Heart Stent\"   • BRONCHOSCOPY-ENDO  3/25/2009    Performed by SINCERE PABLO at SURGERY [facility]   • CATARACT EXTRACTION WITH IOL      B/L   • OTHER      TKA B/L       History reviewed. No pertinent family history.    Social History     Socioeconomic History   • Marital status:      Spouse name: Not on file   • Number of children: Not on file   • Years of education: Not on file   • Highest education level: Not on file   Occupational History   • Not on file   Social Needs   • Financial resource strain: Not on file   • Food insecurity     Worry: Not on file     Inability: Not on file   • Transportation needs     Medical: Not on file     Non-medical: Not on file   Tobacco Use   • Smoking status: Former Smoker     Packs/day: 1.00     Years: 25.00     Pack years: 25.00     Types: Cigarettes     Last attempt to quit: 1998     Years since quittin.5   • Smokeless tobacco: Never Used   Substance and Sexual Activity   • Alcohol use: No     Alcohol/week: 0.0 oz   • Drug use: No   • Sexual activity: Not on file   Lifestyle   • Physical activity     Days per week: Not on file     Minutes per session: Not on file   • Stress: Not on file   Relationships   • Social connections     Talks on phone: Not on file     Gets together: Not on file     Attends Denominational service: Not on file     Active member of club or organization: Not on file     Attends meetings of clubs or organizations: Not on file     Relationship status: Not on file   • Intimate partner violence     Fear of current or ex partner: Not on file     Emotionally abused: Not on file     Physically abused: Not on file     Forced sexual activity: Not on file   Other Topics Concern   • Not on file   Social History Narrative   • Not " "on file       Allergies as of 07/13/2020 - Reviewed 07/13/2020   Allergen Reaction Noted   • Morphine Anxiety 03/21/2016        Vitals:  /72 (BP Location: Left arm, Patient Position: Sitting, BP Cuff Size: Adult)   Pulse (!) 59   Resp 16   Ht 1.753 m (5' 9\")   Wt 100.7 kg (222 lb)   SpO2 94%     Current medications as of today   Current Outpatient Medications   Medication Sig Dispense Refill   • Tiotropium Bromide Monohydrate (SPIRIVA RESPIMAT) 2.5 MCG/ACT Aero Soln Inhale 2 Puffs by mouth every day. 1 Inhaler 11   • aspirin 81 MG tablet Take 81 mg by mouth every day.     • simvastatin (ZOCOR) 80 MG tablet Take 80 mg by mouth every day.     • Multiple Vitamins-Minerals (OCUVITE PO) Take 1 Cap by mouth every day.     • Multiple Vitamins-Minerals (CENTRUM SILVER ADULT 50+ PO) Take 1 Tab by mouth every day.     • Omega-3 Fatty Acids (FISH OIL PO) Take 1 Cap by mouth every day.     • Coenzyme Q10 (COQ-10) 100 MG Cap Take 1 Cap by mouth every day.     • Ascorbic Acid (VITAMIN C PO) Take 500 mg by mouth every day.     • lisinopril (PRINIVIL) 5 MG Tab Take 5 mg by mouth every day.     • Cobalamine Combinations (VITAMIN B12-FOLIC ACID PO) Take 1 Tab by mouth every day.     • CEPHALEXIN 500 MG PO CAPS Take 1,000 mg by mouth every day. For an infection in the leg for 7 years.     • BOOSTRIX 5-2.5-18.5 Suspension ADM 0.5ML IM UTD  0     No current facility-administered medications for this visit.          Physical Exam:   Gen:           Alert and oriented, No apparent distress. Mood and affect appropriate, normal interaction with examiner.  Eyes:          PERRL, EOM intact, sclere white, conjunctive moist.  Ears:          Not examined.   Hearing:     Grossly intact.  Nose:          Normal, no lesions or deformities.  Dentition:    Good dentition.  Oropharynx:   mask  Mallampati Classification: mask  Neck:        Supple, trachea midline, no masses.  Respiratory Effort: No intercostal retractions or use of accessory " muscles.   Lung Auscultation:      Clear to auscultation bilaterally; no rales, rhonchi or wheezing.  CV:            Regular rate and rhythm. No murmurs, rubs or gallops.  Abd:           Not examined.   Lymphadenopathy: Not examined.  Gait and Station: Normal.  Digits and Nails: No clubbing, cyanosis, petechiae, or nodes.   Cranial Nerves: II-XII grossly intact.  Skin:        No rashes, lesions or ulcers noted.               Ext:           No cyanosis or edema.      Assessment:  1. Obstructive sleep apnea     2. Insomnia, unspecified type     3. Centrilobular emphysema (HCC)     4. BMI 32.0-32.9,adult  Height And Weight   5. Former smoker         Immunizations:    Flu:12/2019  Pneumovax 23:6/2014  Prevnar 13:11/2015    Plan:  1.  JEFF is clinically stable.  Patient will continue CPAP nightly.  DME mask/place.  2.  Patient's respiratory status also reviewed, will stop Spiriva and initiate Anoro 1 puff daily.  Rx sent to pharmacy.  Patient will check cost.  If he has any difficulties or worsening of dyspnea will contact me sooner.  May continue albuterol HFA inhaler as needed.  3.  Discussed sleep and restaurant hygiene.  4.  Strongly encouraged weight loss through diet and exercise.  5.  Follow-up in 1 year with PFT/compliance report, sooner if needed.    Please note that this dictation was created using voice recognition software. I have made every reasonable attempt to correct obvious errors, but it is possible there are errors of grammar and possibly content that I did not discover before finalizing the note.

## 2021-03-11 ENCOUNTER — OFFICE VISIT (OUTPATIENT)
Dept: SLEEP MEDICINE | Facility: MEDICAL CENTER | Age: 81
End: 2021-03-11
Payer: COMMERCIAL

## 2021-03-11 ENCOUNTER — NON-PROVIDER VISIT (OUTPATIENT)
Dept: SLEEP MEDICINE | Facility: MEDICAL CENTER | Age: 81
End: 2021-03-11
Payer: COMMERCIAL

## 2021-03-11 VITALS
DIASTOLIC BLOOD PRESSURE: 60 MMHG | HEART RATE: 59 BPM | RESPIRATION RATE: 16 BRPM | WEIGHT: 223 LBS | OXYGEN SATURATION: 93 % | BODY MASS INDEX: 33.03 KG/M2 | HEIGHT: 69 IN | SYSTOLIC BLOOD PRESSURE: 134 MMHG

## 2021-03-11 DIAGNOSIS — J43.2 CENTRILOBULAR EMPHYSEMA (HCC): Chronic | ICD-10-CM

## 2021-03-11 DIAGNOSIS — J43.2 CENTRILOBULAR EMPHYSEMA (HCC): ICD-10-CM

## 2021-03-11 DIAGNOSIS — G47.33 OBSTRUCTIVE SLEEP APNEA: Chronic | ICD-10-CM

## 2021-03-11 DIAGNOSIS — R06.02 SHORTNESS OF BREATH: ICD-10-CM

## 2021-03-11 DIAGNOSIS — E66.2 OBESITY HYPOVENTILATION SYNDROME (HCC): ICD-10-CM

## 2021-03-11 DIAGNOSIS — Z87.891 FORMER SMOKER: ICD-10-CM

## 2021-03-11 DIAGNOSIS — R91.8 PULMONARY NODULES: Chronic | ICD-10-CM

## 2021-03-11 PROCEDURE — 99214 OFFICE O/P EST MOD 30 MIN: CPT | Mod: 25 | Performed by: NURSE PRACTITIONER

## 2021-03-11 PROCEDURE — 94726 PLETHYSMOGRAPHY LUNG VOLUMES: CPT | Performed by: INTERNAL MEDICINE

## 2021-03-11 PROCEDURE — 94060 EVALUATION OF WHEEZING: CPT | Performed by: INTERNAL MEDICINE

## 2021-03-11 PROCEDURE — 94729 DIFFUSING CAPACITY: CPT | Performed by: INTERNAL MEDICINE

## 2021-03-11 PROCEDURE — 94761 N-INVAS EAR/PLS OXIMETRY MLT: CPT | Performed by: NURSE PRACTITIONER

## 2021-03-11 RX ORDER — ALBUTEROL SULFATE 90 UG/1
2 AEROSOL, METERED RESPIRATORY (INHALATION) EVERY 6 HOURS PRN
Qty: 8.5 G | Refills: 11 | Status: SHIPPED | OUTPATIENT
Start: 2021-03-11 | End: 2021-03-11

## 2021-03-11 RX ORDER — ALBUTEROL SULFATE 90 UG/1
AEROSOL, METERED RESPIRATORY (INHALATION)
Qty: 3 EACH | Refills: 3 | Status: SHIPPED | OUTPATIENT
Start: 2021-03-11 | End: 2024-03-13 | Stop reason: SDUPTHER

## 2021-03-11 ASSESSMENT — PULMONARY FUNCTION TESTS
FEV1_PERCENT_PREDICTED: 81
FEV1: 2.06
FEV1/FVC_PERCENT_CHANGE: 1
FEV1/FVC_PERCENT_PREDICTED: 63
FVC_PERCENT_PREDICTED: 125
FEV1/FVC_PREDICTED: 75
FEV1_PERCENT_CHANGE: 0
FEV1/FVC: 48
FEV1: 2.06
FEV1/FVC_PERCENT_LLN: 63
FEV1/FVC_PERCENT_PREDICTED: 64
FEV1/FVC: 48.58
FEV1/FVC_PERCENT_PREDICTED: 64
FEV1/FVC_PERCENT_PREDICTED: 75
FVC: 4.28
FEV1/FVC: 49
FEV1_LLN: 2.11
FEV1_PREDICTED: 2.53
FVC_LLN: 2.82
FEV1_PERCENT_PREDICTED: 81
FVC: 4.24
FEV1/FVC: 48
FEV1/FVC_PERCENT_PREDICTED: 65
FEV1_PERCENT_CHANGE: 0
FVC_PERCENT_PREDICTED: 126
FVC_PREDICTED: 3.38

## 2021-03-11 NOTE — LETTER
ERLIN Garcia  [facility] Pulmonary Medicine   [address], [address] [place] [address]  Phone: [phone] - Fax:             Encounter Date: 3/11/2021  Provider: ERLIN Garcia  Location of Care: [place] [facility]ULMON[facility]  [address] [place] [address]      Patient:   Danielito Tolliver   MR Number: [id number]   YOB: 1940     PROGRESS NOTE:  Chief Complaint   Patient presents with   • Follow-Up     Cenrilobular emphysmea / JEFF  // Last Seen 7/13/2020   • Results     PFT        HPI:  Danielito Tolliver is a 80 y.o. 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   Last OV 7/13/20    PFT 3/11/21 notes FEV1 2.06L or 81%, FEV1/FVC ratio 48, %, and DLCO 78%. No significant bronchodilator response. Reviewed with patient.  He was switched to Anoro last OV and notes less cough/phlegm. He notes dyspnea to be worse and he's not as active.  He notes in cardiology over 6 months ago and I do not have any records or testing to review with him regarding this.  He does not have albuterol HFA inhaler to use as needed.  I reviewed proper use.  He notes shortness of breath with activity such as digging holes or working outside.  He feels he has no shortness of breath with ADLs or things inside the house.  Overall his lung function notes mild COPD with a reduced mildly reduced DLCO related to emphysema which she has.  I am suspicious of possible underlying cardiac issues.  He does have a cardiac history with stent placement in the past.  He is also overweight and agrees that he is deconditioned and needs to be riding his stationary bike at least 30 minutes daily which she has not been doing.  He has gained about 15 pounds over the last 4 years.  BMI 32.    Compliance report 2/9/2021 through 3/10/2021 notes 100% compliance, average nightly 7 hours 49 minutes, mean pressure 6.1 cm, minimal  "mask leak with reduced AHI of 1.4/h.  Reviewed finds with patient.  He notes sleeping well on therapy without complaints.  He is unsure if his oxygen concentrator is functioning properly.  He feels the flow from it is less but the meter does read 4 L/min.  He would like to have this assessed.  His current device is also greater than 5 years old he would like to upgrade therapy.    PULM HX:  Former smoker, quit 1998 with 25PYH.   PFT 5/15/2017 indicates FVC 4.74 L or 135% predicted, FEV1 2.53 L or 100% predicted, FEV1/FVC ratio 53, %, DLCO 90% predicted.   Spirometry 6/5/2019 indicates FEV1 2.06 L or 84%, FEV1/FVC ratio 47%, and mid flows at 37%.  Patient did a significant bronchodilator response.  [place] 11/6/2019 shows further decline with FEV1 1.87 L or 77%, FEV1/FVC ratio 51%.  Patient did have a significant bronchodilator response.  I reviewed finds with patient.  PFT 2/25/20 noted FEV1 2.16L or 84%, FEV1/FVC ratio 49, % and DLCO 84%.  Patient underwent heart cath 2017 and started having weight loss. His dyspnea significanly improved and he stopped inhalers.      He has a history of an abnormal CT scan with pulmonary nodules. The nodules were stable for 2 years; last CT was 7/21/2009. CT scan at [facility] 2/23/16 indicates 8mm noncalcified pulmonary nodule in the right middle lobe and severe centrilobular emphysema. Addendum noted stability of nodule and no more imaging warranted. Results reviewed with patient.    SLEEP HX:  HST 2013 indicated an overall AHI of 15 with a minimum oxygen saturation of 77%.  Patient currently uses auto CPAP 5-15cm H2O with 4 L oxygen bleed in. He will continue to benefit from night time O2 therapy.  CNOX 10/17/2015 indicated normal oximetry on that setting.      ROS: As per HPI and otherwise negative if not stated.    Past Medical History:   Diagnosis Date   • Breath shortness     Uses 02 @ 4L per nc at home prn    • CATARACT     IOL OU   • Cold     coughing up \"dirty\" " "sputum   • Dental disorder     Full Upper   • Emphysema of lung (HCC)    • Hypercholesteremia    • Indigestion    • Infection     left knee post TKA   • JEFF (obstructive sleep apnea)     AUTO CPAP 5-15CM WITH O2 4 LPM   • Pain     low back pain left side   • Psychiatric problem     takes med for depression   • Snoring     Uses CPAP       Past Surgical History:   Procedure Laterality Date   • KNEE ARTHROTOMY Left 3/24/2016    Procedure: KNEE ARTHROTOMY-HARDWARE REMOVAL AND HETEROTOPIC OSSIFICATION;  Surgeon: Yasmani Bradley M.D.;  Location: SURGERY [facility];  Service:    • OTHER ORTHOPEDIC SURGERY  3-21-16    \"Knee ReplacementSurgeries Left kneex3 Rightx2\"    • OTHER CARDIAC SURGERY      \"Open Heart-Coronary Artery Bypass, Heart Stent\"   • BRONCHOSCOPY-ENDO  3/25/2009    Performed by SINCERE PABLO at [facility]   • CATARACT EXTRACTION WITH IOL      B/L   • OTHER      TKA B/L       History reviewed. No pertinent family history.    Social History     Socioeconomic History   • Marital status:      Spouse name: Not on file   • Number of children: Not on file   • Years of education: Not on file   • Highest education level: Not on file   Occupational History   • Not on file   Tobacco Use   • Smoking status: Former Smoker     Packs/day: 1.00     Years: 25.00     Pack years: 25.00     Types: Cigarettes     Quit date: 1998     Years since quittin.2   • Smokeless tobacco: Never Used   Substance and Sexual Activity   • Alcohol use: No     Alcohol/week: 0.0 oz   • Drug use: No   • Sexual activity: Not on file   Other Topics Concern   • Not on file   Social History Narrative   • Not on file     Social Determinants of Health     Financial Resource Strain:    • Difficulty of Paying Living Expenses:    Food Insecurity:    • Worried About Running Out of Food in the Last Year:    • Ran Out of Food in the Last Year:    Transportation Needs:    • Lack of Transportation (Medical):    • Lack " "of Transportation (Non-Medical):    Physical Activity:    • Days of Exercise per Week:    • Minutes of Exercise per Session:    Stress:    • Feeling of Stress :    Social Connections:    • Frequency of Communication with Friends and Family:    • Frequency of Social Gatherings with Friends and Family:    • Attends Yarsanism Services:    • Active Member of Clubs or Organizations:    • Attends Club or Organization Meetings:    • Marital Status:    Intimate Partner Violence:    • Fear of Current or Ex-Partner:    • Emotionally Abused:    • Physically Abused:    • Sexually Abused:        Allergies as of 03/11/2021 - Reviewed 03/11/2021   Allergen Reaction Noted   • Morphine Anxiety 03/21/2016        Vitals:  /60 (BP Location: Left arm, Patient Position: Sitting, BP Cuff Size: Adult)   Pulse (!) 59   Resp 16   Ht 1.753 m (5' 9\")   Wt 101 kg (223 lb)   SpO2 93%     Current medications as of today   Current Outpatient Medications   Medication Sig Dispense Refill   • umeclidinium-vilanterol (ANORO ELLIPTA) 62.5-25 MCG/INH AEROSOL POWDER, BREATH ACTIVATED inhaler Inhale 1 Puff by mouth every day. 1 Inhaler 11   • Tiotropium Bromide Monohydrate (SPIRIVA RESPIMAT) 2.5 MCG/ACT Aero Soln Inhale 2 Puffs by mouth every day. 1 Inhaler 11   • aspirin 81 MG tablet Take 81 mg by mouth every day.     • simvastatin (ZOCOR) 80 MG tablet Take 80 mg by mouth every day.     • Multiple Vitamins-Minerals (OCUVITE PO) Take 1 Cap by mouth every day.     • Multiple Vitamins-Minerals (CENTRUM SILVER ADULT 50+ PO) Take 1 Tab by mouth every day.     • Omega-3 Fatty Acids (FISH OIL PO) Take 1 Cap by mouth every day.     • Coenzyme Q10 (COQ-10) 100 MG Cap Take 1 Cap by mouth every day.     • Ascorbic Acid (VITAMIN C PO) Take 500 mg by mouth every day.     • lisinopril (PRINIVIL) 5 MG Tab Take 5 mg by mouth every day.     • Cobalamine Combinations (VITAMIN B12-FOLIC ACID PO) Take 1 Tab by mouth every day.     • CEPHALEXIN 500 MG PO CAPS Take " 1,000 mg by mouth every day. For an infection in the leg for 7 years.     • BOOSTRIX 5-2.5-18.5 Suspension ADM 0.5ML IM UTD  0     No current facility-administered medications for this visit.         Physical Exam:   Gen:           Alert and oriented, No apparent distress. Mood and affect appropriate, normal interaction with examiner.  Eyes:          PERRL, EOM intact, sclere white, conjunctive moist.  Ears:          Not examined.   Hearing:     Grossly intact.  Nose:          Normal, no lesions or deformities.  Dentition:    mask  Oropharynx:   mask  Mallampati Classification: mask  Neck:        Supple, trachea midline, no masses.  Respiratory Effort: No intercostal retractions or use of accessory muscles.   Lung Auscultation:      Clear to auscultation bilaterally; no rales, rhonchi or wheezing.  CV:            Regular rate and rhythm. No murmurs, rubs or gallops.  Abd:           Not examined. Soft non tender, non distended. Normal active bowel sounds. No masses.  Lymphadenopathy: No palpable nodes or edema.  Gait and Station: Normal.  Digits and Nails: No clubbing, cyanosis, petechiae, or nodes.   Cranial Nerves: II-XII grossly intact.  Skin:        No rashes, lesions or ulcers noted.               Ext:           No cyanosis or edema.      Assessment:  1. Centrilobular emphysema (HCC)     2. Obstructive sleep apnea     3. Pulmonary nodules     4. BMI 32.0-32.9,adult  Height And Weight   5. Former smoker         Immunizations:    Flu:recommend  Pneumovax 23:2014  Prevnar 13:2015    Plan:  1.  Patient has mild COPD and is clinically stable on review of PFT but he is has had some decline in lung volumes over time.  He is currently on Anoro 1 puff daily which we will continue.  Advise starting albuterol HFA inhaler prior to exercise more strenuous activities.  Rx sent to pharmacy.  Multiox in office noted desaturation to 88% on RA and 2LPM required to maintain >90%.  DME o2 fo 2LPM exertion prn and continued  nocturnal 4LPM bleed in  2.  Discussed sleep and restaurant hygiene.  3.  Continue auto CPAP 5 to 15 cm nightly with 4 L oxygen bleed in.  Patient will continue to benefit from therapy.  His device is greater than 5 years old we will upgrade.  DME CPAP 5 to 15 cm  DME mask/supplies  DME other; assess o2 concentrator function  4.  Strongly encouraged restarting daily stationary bike exercise for 30 minutes and dietary changes for weight loss.  This should improve his shortness of breath and overall health.  5.  Call cardiologist office to find out when his follow-up is.  I am suspicious there could be underlying cardiac changes that we are not aware of.  I will also request the patient's last office visit and last echo from cardiology.  6.  Follow-up in 3 months for compliance check on new CPAP device/review of dyspnea, sooner if needed.    Please note that this dictation was created using voice recognition software. I have made every reasonable attempt to correct obvious errors, but it is possible there are errors of grammar and possibly content that I did not discover before finalizing the note.          Electronically signed by ERLIN Garcia  on 03/11/21    Chad Wright D.O.  [address] [place] [phone]  Via Fax: [phone]

## 2021-03-11 NOTE — PROCEDURES
Multi-Ox Readings  Multi Ox #1 Room air   O2 sat % at rest 94   O2 sat % on exertion 88   O2 sat average on exertion     Multi Ox #2 2 LPM   O2 sat % at rest 95   O2 sat % on exertion 95   O2 sat average on exertion       Oxygen Use     Oxygen Frequency     Duration of need     Is the patient mobile within the home?     CPAP Use?     BIPAP Use?     Servo Titration

## 2021-03-11 NOTE — PROCEDURES
Technician: MO Mcknight    Technician Comment:  Good patient effort & cooperation.  The results of this test meet the ATS/ERS standards for acceptability & reproducibility.  Test was performed on the [employer] Body Plethysmograph-Elite DX system.  Predicted values were GLI-2012 for spirometry, GLI-2017 for DLCO, ITS for Lung Volumes.  The DLCO was uncorrected for Hgb.  A bronchodilator of Ventolin HFA -2puffs via spacer administered.  DLCO performed during dilation period.    FVC of 4.24 L 125%, FEV1 of 2.06 L 81% with FEV1/FVC:49%, %.  DLCO: 78%; with no bronchodilator response.      Interpretation:  Mild COPD with FEV1 2.06 L or 81% predicted.  Hyperinflation and air trapping consistent with obstructive lung disease.  Mild diffusion impairment consistent with emphysema.

## 2021-03-11 NOTE — PROGRESS NOTES
Chief Complaint   Patient presents with   • Follow-Up     Cenrilobular emphysmea / JEFF  // Last Seen 7/13/2020   • Results     PFT        HPI:  Danielito Tolliver is a 80 y.o. 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   Last OV 7/13/20    PFT 3/11/21 notes FEV1 2.06L or 81%, FEV1/FVC ratio 48, %, and DLCO 78%. No significant bronchodilator response. Reviewed with patient.  He was switched to Anoro last OV and notes less cough/phlegm. He notes dyspnea to be worse and he's not as active.  He notes in cardiology over 6 months ago and I do not have any records or testing to review with him regarding this.  He does not have albuterol HFA inhaler to use as needed.  I reviewed proper use.  He notes shortness of breath with activity such as digging holes or working outside.  He feels he has no shortness of breath with ADLs or things inside the house.  Overall his lung function notes mild COPD with a reduced mildly reduced DLCO related to emphysema which she has.  I am suspicious of possible underlying cardiac issues.  He does have a cardiac history with stent placement in the past.  He is also overweight and agrees that he is deconditioned and needs to be riding his stationary bike at least 30 minutes daily which she has not been doing.  He has gained about 15 pounds over the last 4 years.  BMI 32.    Compliance report 2/9/2021 through 3/10/2021 notes 100% compliance, average nightly 7 hours 49 minutes, mean pressure 6.1 cm, minimal mask leak with reduced AHI of 1.4/h.  Reviewed finds with patient.  He notes sleeping well on therapy without complaints.  He is unsure if his oxygen concentrator is functioning properly.  He feels the flow from it is less but the meter does read 4 L/min.  He would like to have this assessed.  His current device is also greater than 5 years old he would like to upgrade therapy.    PULM HX:  Former smoker, quit 1998 with 25PYH.   PFT 5/15/2017  "indicates FVC 4.74 L or 135% predicted, FEV1 2.53 L or 100% predicted, FEV1/FVC ratio 53, %, DLCO 90% predicted.   Spirometry 6/5/2019 indicates FEV1 2.06 L or 84%, FEV1/FVC ratio 47%, and mid flows at 37%.  Patient did a significant bronchodilator response.  [place] 11/6/2019 shows further decline with FEV1 1.87 L or 77%, FEV1/FVC ratio 51%.  Patient did have a significant bronchodilator response.  I reviewed finds with patient.  PFT 2/25/20 noted FEV1 2.16L or 84%, FEV1/FVC ratio 49, % and DLCO 84%.  Patient underwent heart cath 2017 and started having weight loss. His dyspnea significanly improved and he stopped inhalers.      He has a history of an abnormal CT scan with pulmonary nodules. The nodules were stable for 2 years; last CT was 7/21/2009. CT scan at [facility] 2/23/16 indicates 8mm noncalcified pulmonary nodule in the right middle lobe and severe centrilobular emphysema. Addendum noted stability of nodule and no more imaging warranted. Results reviewed with patient.    SLEEP HX:  HST 2013 indicated an overall AHI of 15 with a minimum oxygen saturation of 77%.  Patient currently uses auto CPAP 5-15cm H2O with 4 L oxygen bleed in. He will continue to benefit from night time O2 therapy.  CNOX 10/17/2015 indicated normal oximetry on that setting.      ROS: As per HPI and otherwise negative if not stated.    Past Medical History:   Diagnosis Date   • Breath shortness     Uses 02 @ 4L per nc at home prn    • CATARACT     IOL OU   • Cold     coughing up \"dirty\" sputum   • Dental disorder     Full Upper   • Emphysema of lung (HCC)    • Hypercholesteremia    • Indigestion    • Infection     left knee post TKA   • JEFF (obstructive sleep apnea)     AUTO CPAP 5-15CM WITH O2 4 LPM   • Pain     low back pain left side   • Psychiatric problem     takes med for depression   • Snoring     Uses CPAP       Past Surgical History:   Procedure Laterality Date   • KNEE ARTHROTOMY Left 3/24/2016    Procedure: KNEE " "ARTHROTOMY-HARDWARE REMOVAL AND HETEROTOPIC OSSIFICATION;  Surgeon: Yasmani Bradley M.D.;  Location: SURGERY [facility];  Service:    • OTHER ORTHOPEDIC SURGERY  3-21-16    \"Knee ReplacementSurgeries Left kneex3 Rightx2\"    • OTHER CARDIAC SURGERY      \"Open Heart-Coronary Artery Bypass, Heart Stent\"   • BRONCHOSCOPY-ENDO  3/25/2009    Performed by SINCERE PABLO at SURGERY [facility]   • CATARACT EXTRACTION WITH IOL      B/L   • OTHER      TKA B/L       History reviewed. No pertinent family history.    Social History     Socioeconomic History   • Marital status:      Spouse name: Not on file   • Number of children: Not on file   • Years of education: Not on file   • Highest education level: Not on file   Occupational History   • Not on file   Tobacco Use   • Smoking status: Former Smoker     Packs/day: 1.00     Years: 25.00     Pack years: 25.00     Types: Cigarettes     Quit date: 1998     Years since quittin.2   • Smokeless tobacco: Never Used   Substance and Sexual Activity   • Alcohol use: No     Alcohol/week: 0.0 oz   • Drug use: No   • Sexual activity: Not on file   Other Topics Concern   • Not on file   Social History Narrative   • Not on file     Social Determinants of Health     Financial Resource Strain:    • Difficulty of Paying Living Expenses:    Food Insecurity:    • Worried About Running Out of Food in the Last Year:    • Ran Out of Food in the Last Year:    Transportation Needs:    • Lack of Transportation (Medical):    • Lack of Transportation (Non-Medical):    Physical Activity:    • Days of Exercise per Week:    • Minutes of Exercise per Session:    Stress:    • Feeling of Stress :    Social Connections:    • Frequency of Communication with Friends and Family:    • Frequency of Social Gatherings with Friends and Family:    • Attends Restorationism Services:    • Active Member of Clubs or Organizations:    • Attends Club or Organization Meetings:    • Marital Status:  " "  Intimate Partner Violence:    • Fear of Current or Ex-Partner:    • Emotionally Abused:    • Physically Abused:    • Sexually Abused:        Allergies as of 03/11/2021 - Reviewed 03/11/2021   Allergen Reaction Noted   • Morphine Anxiety 03/21/2016        Vitals:  /60 (BP Location: Left arm, Patient Position: Sitting, BP Cuff Size: Adult)   Pulse (!) 59   Resp 16   Ht 1.753 m (5' 9\")   Wt 101 kg (223 lb)   SpO2 93%     Current medications as of today   Current Outpatient Medications   Medication Sig Dispense Refill   • umeclidinium-vilanterol (ANORO ELLIPTA) 62.5-25 MCG/INH AEROSOL POWDER, BREATH ACTIVATED inhaler Inhale 1 Puff by mouth every day. 1 Inhaler 11   • Tiotropium Bromide Monohydrate (SPIRIVA RESPIMAT) 2.5 MCG/ACT Aero Soln Inhale 2 Puffs by mouth every day. 1 Inhaler 11   • aspirin 81 MG tablet Take 81 mg by mouth every day.     • simvastatin (ZOCOR) 80 MG tablet Take 80 mg by mouth every day.     • Multiple Vitamins-Minerals (OCUVITE PO) Take 1 Cap by mouth every day.     • Multiple Vitamins-Minerals (CENTRUM SILVER ADULT 50+ PO) Take 1 Tab by mouth every day.     • Omega-3 Fatty Acids (FISH OIL PO) Take 1 Cap by mouth every day.     • Coenzyme Q10 (COQ-10) 100 MG Cap Take 1 Cap by mouth every day.     • Ascorbic Acid (VITAMIN C PO) Take 500 mg by mouth every day.     • lisinopril (PRINIVIL) 5 MG Tab Take 5 mg by mouth every day.     • Cobalamine Combinations (VITAMIN B12-FOLIC ACID PO) Take 1 Tab by mouth every day.     • CEPHALEXIN 500 MG PO CAPS Take 1,000 mg by mouth every day. For an infection in the leg for 7 years.     • BOOSTRIX 5-2.5-18.5 Suspension ADM 0.5ML IM UTD  0     No current facility-administered medications for this visit.         Physical Exam:   Gen:           Alert and oriented, No apparent distress. Mood and affect appropriate, normal interaction with examiner.  Eyes:          PERRL, EOM intact, sclere white, conjunctive moist.  Ears:          Not examined.   Hearing:  "    Grossly intact.  Nose:          Normal, no lesions or deformities.  Dentition:    mask  Oropharynx:   mask  Mallampati Classification: mask  Neck:        Supple, trachea midline, no masses.  Respiratory Effort: No intercostal retractions or use of accessory muscles.   Lung Auscultation:      Clear to auscultation bilaterally; no rales, rhonchi or wheezing.  CV:            Regular rate and rhythm. No murmurs, rubs or gallops.  Abd:           Not examined. Soft non tender, non distended. Normal active bowel sounds. No masses.  Lymphadenopathy: No palpable nodes or edema.  Gait and Station: Normal.  Digits and Nails: No clubbing, cyanosis, petechiae, or nodes.   Cranial Nerves: II-XII grossly intact.  Skin:        No rashes, lesions or ulcers noted.               Ext:           No cyanosis or edema.      Assessment:  1. Centrilobular emphysema (HCC)     2. Obstructive sleep apnea     3. Pulmonary nodules     4. BMI 32.0-32.9,adult  Height And Weight   5. Former smoker         Immunizations:    Flu:recommend  Pneumovax 23:2014  Prevnar 13:2015    Plan:  1.  Patient has mild COPD and is clinically stable on review of PFT but he is has had some decline in lung volumes over time.  He is currently on Anoro 1 puff daily which we will continue.  Advise starting albuterol HFA inhaler prior to exercise more strenuous activities.  Rx sent to pharmacy.  Multiox in office noted desaturation to 88% on RA and 2LPM required to maintain >90%.  DME o2 fo 2LPM exertion prn and continued nocturnal 4LPM bleed in recommended; patient declined daytime O2 use.  2.  Discussed sleep and restaurant hygiene.  3.  Continue auto CPAP 5 to 15 cm nightly with 4 L oxygen bleed in.  Patient will continue to benefit from therapy.  His device is greater than 5 years old we will upgrade.  DME CPAP 5 to 15 cm  DME mask/supplies  DME other; assess o2 concentrator function  4.  Strongly encouraged restarting daily stationary bike exercise for 30 minutes  and dietary changes for weight loss.  This should improve his shortness of breath and overall health.  5.  Call cardiologist office to find out when his follow-up is.  I am suspicious there could be underlying cardiac changes that we are not aware of.  I will also request the patient's last office visit and last echo from cardiology.  6.  Follow-up in 3 months for compliance check on new CPAP device/review of dyspnea, sooner if needed.    Please note that this dictation was created using voice recognition software. I have made every reasonable attempt to correct obvious errors, but it is possible there are errors of grammar and possibly content that I did not discover before finalizing the note.

## 2021-03-25 ENCOUNTER — TELEPHONE (OUTPATIENT)
Dept: SLEEP MEDICINE | Facility: MEDICAL CENTER | Age: 81
End: 2021-03-25

## 2021-03-25 NOTE — TELEPHONE ENCOUNTER
Edith called in today, stated they got an order from us for patient's CPAP. Patient is not currently using [facility] for his CPAP  according to Edith. Edith needs more information from us. Sleep study results,  compliance, and more information about his current machine. Please fax [phone].

## 2021-03-25 NOTE — TELEPHONE ENCOUNTER
Order placed 3/11/21 to have DME check concentrator is functioning and reading 4LPM. He was unsure if he was getting the O2 from device.

## 2021-03-25 NOTE — TELEPHONE ENCOUNTER
Patient left  states oxygen company came to his house and informed him of the high flow liter concentrator. Per pt message states per LEILANI Martin, aprn visit was informed to increase oxygen. Patient order for high flow oxygen.     Oxygen order placed 3/11/21 cancelled. Please advise

## 2021-03-25 NOTE — TELEPHONE ENCOUNTER
I spoke to the patient, states when [facility] came to service his machine. They have recommended a concentrator 1-10. Patient currently has a concentrator 1-5. Patient is using 4 LPM. States per [facility] they said when using up to 4-5 liters concentrator over heats. I informed the patient there is a process to get a high flow concentrator. Pt stated he was not concerned about getting a high flow.pt confirmed aware to using 4 lpm as suggested per [facility] [place]. Pt stated he had the concentrator switched 6 mos ago. Has not worked the same since.

## 2021-03-26 NOTE — TELEPHONE ENCOUNTER
"refaxed order, notes and SS to [facility]  Original dx SS is an Apnea Link provided by DME which is technically a \"non qualifying\" test but since [facility] [place] to provide pt's first machine in 2013, then Edith really can't decline the study but at the same time I can't force them to provide the machine.    Included cover sheet advising testing has been accepted in the past and will see what happens  Working on o2 issues in separate encounter.    "

## 2021-04-05 ENCOUNTER — TELEPHONE (OUTPATIENT)
Dept: SLEEP MEDICINE | Facility: MEDICAL CENTER | Age: 81
End: 2021-04-05

## 2021-04-05 NOTE — TELEPHONE ENCOUNTER
Edith called in today. They are requesting sleep study results. Please fax to [phone]. Thank you!

## 2021-04-06 NOTE — TELEPHONE ENCOUNTER
I have provided the APNEA link to this office several times.    Spoke to Sandrine at the [place] office and explained the Apnea like that was provided has been accepted by his [facility] MEDICARE plan already and they should continue to accept it.    She reviewed the account and agreed and will have the rep working on the account submit for auth.    They will reach out to us if [facility] decides they want updated testing (which is highly doubtful since they have accepted it in the past)

## 2021-06-17 ENCOUNTER — OFFICE VISIT (OUTPATIENT)
Dept: SLEEP MEDICINE | Facility: MEDICAL CENTER | Age: 81
End: 2021-06-17
Payer: COMMERCIAL

## 2021-06-17 ENCOUNTER — TELEPHONE (OUTPATIENT)
Dept: SLEEP MEDICINE | Facility: MEDICAL CENTER | Age: 81
End: 2021-06-17

## 2021-06-17 VITALS
RESPIRATION RATE: 16 BRPM | HEIGHT: 69 IN | DIASTOLIC BLOOD PRESSURE: 60 MMHG | SYSTOLIC BLOOD PRESSURE: 110 MMHG | BODY MASS INDEX: 33.47 KG/M2 | OXYGEN SATURATION: 91 % | HEART RATE: 60 BPM | WEIGHT: 226 LBS

## 2021-06-17 DIAGNOSIS — R91.8 PULMONARY NODULES: Chronic | ICD-10-CM

## 2021-06-17 DIAGNOSIS — R06.02 SHORTNESS OF BREATH: ICD-10-CM

## 2021-06-17 DIAGNOSIS — J43.2 CENTRILOBULAR EMPHYSEMA (HCC): Chronic | ICD-10-CM

## 2021-06-17 DIAGNOSIS — Z87.891 FORMER SMOKER: ICD-10-CM

## 2021-06-17 DIAGNOSIS — G47.33 OBSTRUCTIVE SLEEP APNEA: Chronic | ICD-10-CM

## 2021-06-17 PROCEDURE — 99214 OFFICE O/P EST MOD 30 MIN: CPT | Performed by: NURSE PRACTITIONER

## 2021-06-17 PROCEDURE — 94761 N-INVAS EAR/PLS OXIMETRY MLT: CPT | Performed by: PHYSICIAN ASSISTANT

## 2021-06-17 NOTE — PROCEDURES
Multi-Ox Readings  Multi Ox #1 Room air   O2 sat % at rest 92   O2 sat % on exertion 90   O2 sat average on exertion     Multi Ox #2     O2 sat % at rest     O2 sat % on exertion     O2 sat average on exertion       Oxygen Use     Oxygen Frequency     Duration of need     Is the patient mobile within the home?     CPAP Use?     BIPAP Use?     Servo Titration

## 2021-06-17 NOTE — TELEPHONE ENCOUNTER
On flecainide     Echo date: 12/15/2015  LVEF: 73 %  LA size: 27.8 mL  IVS: 0.9 cm          LVPW: 1.0 cm  RVSP: not calculated  Coronary status: Normal Stress Test 12/15/2015     CHADS-VASc score: 4   Anticoagulation therapy: None       Antiarrhythmic medications: Flecainide   Procedures: None      Continue current regimen   Please get new compliance from [facility] Bettencourt to review

## 2021-06-17 NOTE — PROGRESS NOTES
Chief Complaint   Patient presents with   • Follow-Up     Centrilobular emphysema // last seen 3/11/2021       HPI:  Danielito Tolliver is a 80 y.o. 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 his wife accompanies his.  Last OV 3/11/20     Since last OV he continues to note COVARRUBIAS which is unchanged. He did restart stationary bike exercise but was not tolerating well. He used RESHMA prior to exercise today and tolerated well. We reviewed proper us of inhalers.  He received a new CPAP and remains on APAP 5-15cm w/4LPM. We do not have compliance to review so we will request it. He notes using consistently every night. He feels he sleeps well on therapy; no AM headaches or daytime napping. He has no other complaints. He will continue to benefit from therapy.     PULM HX:  Former smoker, quit 1998 with 25PYH.   PFT 5/15/2017 indicates FVC 4.74 L or 135% predicted, FEV1 2.53 L or 100% predicted, FEV1/FVC ratio 53, %, DLCO 90% predicted.   Spirometry 6/5/2019 indicates FEV1 2.06 L or 84%, FEV1/FVC ratio 47%, and mid flows at 37%.  Patient did a significant bronchodilator response.  [place] 11/6/2019 shows further decline with FEV1 1.87 L or 77%, FEV1/FVC ratio 51%.  Patient did have a significant bronchodilator response.  I reviewed finds with patient.  PFT 2/25/20 noted FEV1 2.16L or 84%, FEV1/FVC ratio 49, % and DLCO 84%.  PFT 3/11/21 notes FEV1 2.06L or 81%, FEV1/FVC ratio 48, %, and DLCO 78%. No significant bronchodilator response. Reviewed with patient.  Patient underwent heart cath 2017 and started having weight loss. His dyspnea significanly improved and he stopped inhalers.       He has a history of an abnormal CT scan with pulmonary nodules. The nodules were stable for 2 years; last CT was 7/21/2009. CT scan at [facility] 2/23/16 indicates 8mm noncalcified pulmonary nodule in the right middle lobe and severe centrilobular emphysema. Addendum noted stability of  "nodule and no more imaging warranted. Results reviewed with patient.     SLEEP HX:  HST 2013 indicated an overall AHI of 15 with a minimum oxygen saturation of 77%.  Patient currently uses auto CPAP 5-15cm H2O with 4 L oxygen bleed in. He will continue to benefit from night time O2 therapy.  CNOX 10/17/2015 indicated normal oximetry on that setting.  Compliance report 2/9/2021 through 3/10/2021 notes 100% compliance, average nightly 7 hours 49 minutes, mean pressure 6.1 cm, minimal mask leak with reduced AHI of 1.4/h.  New device ordered 2021.    ROS: As per HPI and otherwise negative if not stated.    Past Medical History:   Diagnosis Date   • Breath shortness     Uses 02 @ 4L per nc at home prn    • CATARACT     IOL OU   • Cold     coughing up \"dirty\" sputum   • Dental disorder     Full Upper   • Emphysema of lung (HCC)    • Hypercholesteremia    • Indigestion    • Infection     left knee post TKA   • JEFF (obstructive sleep apnea)     AUTO CPAP 5-15CM WITH O2 4 LPM   • Pain     low back pain left side   • Psychiatric problem     takes med for depression   • Snoring     Uses CPAP       Past Surgical History:   Procedure Laterality Date   • KNEE ARTHROTOMY Left 3/24/2016    Procedure: KNEE ARTHROTOMY-HARDWARE REMOVAL AND HETEROTOPIC OSSIFICATION;  Surgeon: Yasmani Bradley M.D.;  Location: SURGERY [facility];  Service:    • OTHER ORTHOPEDIC SURGERY  3-21-16    \"Knee ReplacementSurgeries Left kneex3 Rightx2\"    • OTHER CARDIAC SURGERY  2013    \"Open Heart-Coronary Artery Bypass, Heart Stent\"   • BRONCHOSCOPY-ENDO  3/25/2009    Performed by SINCERE PABLO at [facility]   • CATARACT EXTRACTION WITH IOL      B/L   • OTHER      TKA B/L       History reviewed. No pertinent family history.    Social History     Socioeconomic History   • Marital status:      Spouse name: Not on file   • Number of children: Not on file   • Years of education: Not on file   • Highest education level: Not on file " "  Occupational History   • Not on file   Tobacco Use   • Smoking status: Former Smoker     Packs/day: 1.00     Years: 25.00     Pack years: 25.00     Types: Cigarettes     Quit date: 1998     Years since quittin.4   • Smokeless tobacco: Never Used   Substance and Sexual Activity   • Alcohol use: No     Alcohol/week: 0.0 oz   • Drug use: No   • Sexual activity: Not on file   Other Topics Concern   • Not on file   Social History Narrative   • Not on file     Social Determinants of Health     Financial Resource Strain:    • Difficulty of Paying Living Expenses:    Food Insecurity:    • Worried About Running Out of Food in the Last Year:    • Ran Out of Food in the Last Year:    Transportation Needs:    • Lack of Transportation (Medical):    • Lack of Transportation (Non-Medical):    Physical Activity:    • Days of Exercise per Week:    • Minutes of Exercise per Session:    Stress:    • Feeling of Stress :    Social Connections:    • Frequency of Communication with Friends and Family:    • Frequency of Social Gatherings with Friends and Family:    • Attends Restorationist Services:    • Active Member of Clubs or Organizations:    • Attends Club or Organization Meetings:    • Marital Status:    Intimate Partner Violence:    • Fear of Current or Ex-Partner:    • Emotionally Abused:    • Physically Abused:    • Sexually Abused:        Allergies as of 2021 - Reviewed 2021   Allergen Reaction Noted   • Morphine Anxiety 2016        Vitals:  /60 (BP Location: Left arm, Patient Position: Sitting, BP Cuff Size: Adult)   Pulse 60   Resp 16   Ht 1.753 m (5' 9\")   Wt 103 kg (226 lb)   SpO2 91%     Current medications as of today   Current Outpatient Medications   Medication Sig Dispense Refill   • albuterol 108 (90 Base) MCG/ACT Aero Soln inhalation aerosol INHALE 2 PUFFS BY MOUTH EVERY 6 HOURS AS NEEDED FOR SHORTNESS OF BREATH 3 Each 3   • umeclidinium-vilanterol (ANORO ELLIPTA) 62.5-25 MCG/INH " AEROSOL POWDER, BREATH ACTIVATED inhaler Inhale 1 Puff by mouth every day. 1 Inhaler 11   • aspirin 81 MG tablet Take 81 mg by mouth every day.     • simvastatin (ZOCOR) 80 MG tablet Take 80 mg by mouth every day.     • Multiple Vitamins-Minerals (OCUVITE PO) Take 1 Cap by mouth every day.     • Multiple Vitamins-Minerals (CENTRUM SILVER ADULT 50+ PO) Take 1 Tab by mouth every day.     • Omega-3 Fatty Acids (FISH OIL PO) Take 1 Cap by mouth every day.     • Coenzyme Q10 (COQ-10) 100 MG Cap Take 1 Cap by mouth every day.     • Ascorbic Acid (VITAMIN C PO) Take 500 mg by mouth every day.     • lisinopril (PRINIVIL) 5 MG Tab Take 5 mg by mouth every day.     • Cobalamine Combinations (VITAMIN B12-FOLIC ACID PO) Take 1 Tab by mouth every day.     • CEPHALEXIN 500 MG PO CAPS Take 1,000 mg by mouth every day. For an infection in the leg for 7 years.       No current facility-administered medications for this visit.         Physical Exam:   Gen:           Alert and oriented, No apparent distress. Mood and affect appropriate, normal interaction with examiner.  Eyes:          PERRL, EOM intact, sclere white, conjunctive moist.  Ears:          Not examined.   Hearing:     Grossly intact.  Nose:          Normal, no lesions or deformities.  Dentition:    mask  Oropharynx:   mask  Mallampati Classification: mask  Neck:        Supple, trachea midline, no masses.  Respiratory Effort: No intercostal retractions or use of accessory muscles.   Lung Auscultation:      Clear to auscultation bilaterally; no rales, rhonchi or wheezing.  CV:            Regular rate and rhythm. No murmurs, rubs or gallops.  Abd:           Not examined.   Lymphadenopathy: Not examined.  Gait and Station: Normal.  Digits and Nails: No clubbing, cyanosis, petechiae, or nodes.   Cranial Nerves: II-XII grossly intact.  Skin:        No rashes, lesions or ulcers noted.               Ext:           No cyanosis or edema.      Assessment:  1. Centrilobular emphysema  (HCC)     2. Pulmonary nodules     3. Obstructive sleep apnea     4. BMI 33.0-33.9,adult     5. Former smoker         Immunizations:    Flu:recommend in fall  Pneumovax 23:2014  Prevnar 13:2015  COVID-19: recommend    Plan:  1.  Patient's mild COPD is clinically stable.  Advised starting regular stationary bike exercise for 30 minutes a day and using albuterol HFA inhaler prior to exercise.  Reviewed appropriate use.  He has gained weight over the last year would benefit from weight loss as well.  Multiox indicated adequate oxygen saturation at rest on room air.  2.  Continue nightly CPAP with bleeding O2 at 4 L/min.  Patient will continue to benefit from therapy.  We are requesting updated compliance from DME for review will contact patient with recommendations.  3.  Discussed sleep and respiratory hygiene.  4.  For primary care for the health concerns.  5.  Follow-up in 3 months per patient preference to be seen in fall and spring months, sooner if needed.    Addendum:  Compliance report 5/29/2021 through 6.7 two thousand twenty-one notes 100% compliance, average nightly 7 hours 8 minutes, mean pressure 12.4 cm, with reduced age of 0.7/h.  Using ResMed auto CPAP 5 to 15 cm.  Sleep apnea stable, follow-up as scheduled.    Please note that this dictation was created using voice recognition software. I have made every reasonable attempt to correct obvious errors, but it is possible there are errors of grammar and possibly content that I did not discover before finalizing the note.

## 2021-06-28 NOTE — TELEPHONE ENCOUNTER
Compliance report 5/29/2021 through 6.7 two thousand twenty-one notes 100% compliance, average nightly 7 hours 8 minutes, mean pressure 12.4 cm, with reduced age of 0.7/h.  Using ResMed auto CPAP 5 to 15 cm.  Sleep apnea stable, follow-up as scheduled.

## 2021-08-23 ENCOUNTER — TELEPHONE (OUTPATIENT)
Dept: SLEEP MEDICINE | Facility: MEDICAL CENTER | Age: 81
End: 2021-08-23

## 2021-08-23 NOTE — TELEPHONE ENCOUNTER
Patient calling he traveled to [place] this summer and was given samples of Breztri to try and really likes it better than the inhaler Anoro that he was using. Wants RX sent to his local pharmacy [facility] in [place].    Have we ever prescribed this med? No.  If yes, what date?     Last OV: 6/17/21    Next OV: 9/20/21    DX: COPD/emphysema    Medications: Breztri  I could not load as I don't know what the generic name is: HELP PLEASE

## 2021-09-20 ENCOUNTER — SLEEP CENTER VISIT (OUTPATIENT)
Dept: SLEEP MEDICINE | Facility: MEDICAL CENTER | Age: 81
End: 2021-09-20
Payer: COMMERCIAL

## 2021-09-20 VITALS
BODY MASS INDEX: 34.86 KG/M2 | TEMPERATURE: 98 F | SYSTOLIC BLOOD PRESSURE: 124 MMHG | HEART RATE: 80 BPM | WEIGHT: 230 LBS | HEIGHT: 68 IN | OXYGEN SATURATION: 97 % | RESPIRATION RATE: 18 BRPM | DIASTOLIC BLOOD PRESSURE: 68 MMHG

## 2021-09-20 DIAGNOSIS — G47.33 OBSTRUCTIVE SLEEP APNEA: Chronic | ICD-10-CM

## 2021-09-20 DIAGNOSIS — Z87.891 FORMER SMOKER: ICD-10-CM

## 2021-09-20 DIAGNOSIS — R91.8 PULMONARY NODULES: Chronic | ICD-10-CM

## 2021-09-20 DIAGNOSIS — J43.2 CENTRILOBULAR EMPHYSEMA (HCC): Chronic | ICD-10-CM

## 2021-09-20 PROBLEM — I25.10 ATHEROSCLEROSIS OF CORONARY ARTERY: Status: ACTIVE | Noted: 2021-04-15

## 2021-09-20 PROBLEM — J44.9 COPD (CHRONIC OBSTRUCTIVE PULMONARY DISEASE) (HCC): Status: ACTIVE | Noted: 2021-04-15

## 2021-09-20 PROBLEM — Z95.1 HISTORY OF CORONARY ARTERY BYPASS SURGERY: Status: ACTIVE | Noted: 2021-04-15

## 2021-09-20 PROBLEM — Z95.5 STENTED CORONARY ARTERY: Status: ACTIVE | Noted: 2021-04-15

## 2021-09-20 PROBLEM — E78.2 MIXED HYPERLIPIDEMIA: Status: ACTIVE | Noted: 2021-04-15

## 2021-09-20 PROBLEM — I48.0 PAROXYSMAL ATRIAL FIBRILLATION (HCC): Status: ACTIVE | Noted: 2021-04-15

## 2021-09-20 PROCEDURE — 99214 OFFICE O/P EST MOD 30 MIN: CPT | Performed by: NURSE PRACTITIONER

## 2021-09-20 RX ORDER — PRASUGREL 10 MG/1
10 TABLET, FILM COATED ORAL DAILY
COMMUNITY
Start: 2021-04-21

## 2021-09-20 RX ORDER — BENZALKONIUM CHLORIDE 1.3 MG/ML
CLOTH TOPICAL
COMMUNITY

## 2021-09-20 RX ORDER — ASCORBIC ACID 500 MG
TABLET ORAL
COMMUNITY
End: 2022-11-30

## 2021-09-20 RX ORDER — NITROGLYCERIN 0.4 MG/1
TABLET SUBLINGUAL
COMMUNITY

## 2021-09-20 RX ORDER — TAMSULOSIN HYDROCHLORIDE 0.4 MG/1
CAPSULE ORAL
COMMUNITY
Start: 2021-05-05

## 2021-09-20 ASSESSMENT — PAIN SCALES - GENERAL: PAINLEVEL: NO PAIN

## 2021-09-20 NOTE — PROGRESS NOTES
Chief Complaint   Patient presents with   • Follow-Up     Last Seen 6/17/21   • Apnea     Auto C-pap 5-15       HPI:  Danielito Tolliver is a 81 y.o. year old male here today for follow-up on COPD and JEFF. Hx of pulmonary nodules  Last OV 6/17/21    Since last office visit his inhaler was switched to Breztri 2 puffs twice daily and he feels there is some benefit.  He continues albuterol HFA inhaler as needed but does not use this on a daily basis.  He feels he does have dyspnea with with physical activity.  He is using his stationary bike for exercise but not consistently every day.  He understands the need to have some weight loss.  He has gained slight weight since last office visit.  He did restart Nutrisystem which helped him lose a significant amount of weight previously.  He denies significant cough, phlegm, chest pain, chest tightness or wheezing.  He feels he sleeps well in his device without any issues.  He denies any daytime fatigue.  We do not have compliance to review today but will request from [place].  He continues to note using on a consistent nightly basis greater than 4 hours with benefit.    PULM & SLEEP HX:   Former smoker, quit 1998 with 25PYH.   PFT 5/15/2017 indicates FVC 4.74 L or 135% predicted, FEV1 2.53 L or 100% predicted, FEV1/FVC ratio 53, %, DLCO 90% predicted.   Spirometry 6/5/2019 indicates FEV1 2.06 L or 84%, FEV1/FVC ratio 47%, and mid flows at 37%.  Patient did a significant bronchodilator response.  Pep 11/6/2019 shows further decline with FEV1 1.87 L or 77%, FEV1/FVC ratio 51%.  Patient did have a significant bronchodilator response.  I reviewed finds with patient.  PFT 2/25/20 noted FEV1 2.16L or 84%, FEV1/FVC ratio 49, % and DLCO 84%.  PFT 3/11/21 notes FEV1 2.06L or 81%, FEV1/FVC ratio 48, %, and DLCO 78%. No significant bronchodilator response. Reviewed with patient.  Patient underwent heart cath 2017 and started having weight loss. His dyspnea  "significanly improved and he stopped inhalers.       He has a history of an abnormal CT scan with pulmonary nodules. The nodules were stable for 2 years; last CT was 7/21/2009. CT scan at [facility] 2[facility]23[facility]16 indicates 8mm noncalcified pulmonary nodule in the right middle lobe and severe centrilobular emphysema. Addendum noted stability of nodule and no more imaging warranted. Results reviewed with patient.     HST 2013 indicated an overall AHI of 15 with a minimum oxygen saturation of 77%.  Patient currently uses RESMED auto CPAP 5-15cm H2O with 4 L oxygen bleed in; OBTAINED 2021. He will continue to benefit from night time O2 therapy.  He now has his old device in [place] for when he travels to see his wife.  CNOX 10/17/2015 indicated normal oximetry on that setting.  Compliance report 2/9/2021 through 3/10/2021 notes 100% compliance, average nightly 7 hours 49 minutes, mean pressure 6.1 cm, minimal mask leak with reduced AHI of 1.4/h.      MMRC Grade: 2-3      ROS: As per HPI and otherwise negative if not stated.    Past Medical History:   Diagnosis Date   • Breath shortness     Uses 02 @ 4L per nc at home prn    • CATARACT     IOL OU   • Cold     coughing up \"dirty\" sputum   • Dental disorder     Full Upper   • Emphysema of lung (HCC)    • Hypercholesteremia    • Indigestion    • Infection     left knee post TKA   • JEFF (obstructive sleep apnea)     AUTO CPAP 5-15CM WITH O2 4 LPM   • Pain     low back pain left side   • Psychiatric problem     takes med for depression   • Snoring     Uses CPAP       Past Surgical History:   Procedure Laterality Date   • KNEE ARTHROTOMY Left 3/24/2016    Procedure: KNEE ARTHROTOMY-HARDWARE REMOVAL AND HETEROTOPIC OSSIFICATION;  Surgeon: Yasmani Bradley M.D.;  Location: SURGERY [facility];  Service:    • OTHER ORTHOPEDIC SURGERY  3-21-16    \"Knee ReplacementSurgeries Left kneex3 Rightx2\"    • OTHER CARDIAC SURGERY  2013    \"Open Heart-Coronary Artery Bypass, Heart Stent\"   • " BRONCHOSCOPY-ENDO  3/25/2009    Performed by SINCERE PABLO at SURGERY [facility]   • CATARACT EXTRACTION WITH IOL      B/L   • OTHER      TKA B/L       History reviewed. No pertinent family history.    Social History     Socioeconomic History   • Marital status:      Spouse name: Not on file   • Number of children: Not on file   • Years of education: Not on file   • Highest education level: Not on file   Occupational History   • Not on file   Tobacco Use   • Smoking status: Former Smoker     Packs/day: 1.00     Years: 25.00     Pack years: 25.00     Types: Cigarettes     Quit date: 1998     Years since quittin.7   • Smokeless tobacco: Never Used   Substance and Sexual Activity   • Alcohol use: No     Alcohol/week: 0.0 oz   • Drug use: No   • Sexual activity: Not on file   Other Topics Concern   • Not on file   Social History Narrative   • Not on file     Social Determinants of Health     Financial Resource Strain:    • Difficulty of Paying Living Expenses:    Food Insecurity:    • Worried About Running Out of Food in the Last Year:    • Ran Out of Food in the Last Year:    Transportation Needs:    • Lack of Transportation (Medical):    • Lack of Transportation (Non-Medical):    Physical Activity:    • Days of Exercise per Week:    • Minutes of Exercise per Session:    Stress:    • Feeling of Stress :    Social Connections:    • Frequency of Communication with Friends and Family:    • Frequency of Social Gatherings with Friends and Family:    • Attends Yarsanism Services:    • Active Member of Clubs or Organizations:    • Attends Club or Organization Meetings:    • Marital Status:    Intimate Partner Violence:    • Fear of Current or Ex-Partner:    • Emotionally Abused:    • Physically Abused:    • Sexually Abused:        Allergies as of 2021 - Reviewed 2021   Allergen Reaction Noted   • Morphine Anxiety 2016        Vitals:  /68 (BP Location: Right arm, Patient  "Position: Sitting, BP Cuff Size: Large adult)   Pulse 80   Temp 36.7 °C (98 °F) (Temporal)   Resp 18   Ht 1.727 m (5' 8\")   Wt 104 kg (230 lb)   SpO2 97%     Current medications as of today   Current Outpatient Medications   Medication Sig Dispense Refill   • prasugrel (EFFIENT) 10 MG Tab Take 10 mg by mouth every day.     • tamsulosin (FLOMAX) 0.4 MG capsule 1 capsule daily     • sertraline (ZOLOFT) 50 MG Tab Take 50 mg by mouth every day.     • Budeson-Glycopyrrol-Formoterol (BREZTRI AEROSPHERE) 160-9-4.8 MCG/ACT Aerosol Inhale 2 Puffs 2 times a day. 10.7 g 5   • aspirin 81 MG tablet Take 81 mg by mouth every day.     • simvastatin (ZOCOR) 80 MG tablet Take 80 mg by mouth every day.     • Multiple Vitamins-Minerals (OCUVITE PO) Take 1 Cap by mouth every day.     • Multiple Vitamins-Minerals (CENTRUM SILVER ADULT 50+ PO) Take 1 Tab by mouth every day.     • Omega-3 Fatty Acids (FISH OIL PO) Take 1 Cap by mouth every day.     • Coenzyme Q10 (COQ-10) 100 MG Cap Take 1 Cap by mouth every day.     • Ascorbic Acid (VITAMIN C PO) Take 500 mg by mouth every day.     • lisinopril (PRINIVIL) 5 MG Tab Take 5 mg by mouth every day.     • Cobalamine Combinations (VITAMIN B12-FOLIC ACID PO) Take 1 Tab by mouth every day.     • CEPHALEXIN 500 MG PO CAPS Take 1,000 mg by mouth every day. For an infection in the leg for 7 years.     • ascorbic acid (VITAMIN C) 500 MG tablet 1 tablet Orally As Needed     • nitroglycerin (NITROSTAT) 0.4 MG SL Tab 1 tablet under the tongue and allow to dissolve as needed Sublingual every 5 min as needed for severe chest pain     • Respiratory Therapy Supplies (CARETOUCH 2 CPAP HOSE ) Misc CPAP Machine 3     • albuterol 108 (90 Base) MCG/ACT Aero Soln inhalation aerosol INHALE 2 PUFFS BY MOUTH EVERY 6 HOURS AS NEEDED FOR SHORTNESS OF BREATH 3 Each 3     No current facility-administered medications for this visit.         Physical Exam:   Gen:           Alert and oriented, No apparent " distress. Mood and affect appropriate, normal interaction with examiner.  Eyes:          PERRL, EOM intact, sclere white, conjunctive moist.  Ears:          Not examined.   Hearing:     Grossly intact.  Nose:          Normal, no lesions or deformities.  Dentition:    Mask.  Oropharynx:   Mask.  Mallampati Classification: mask  Neck:        Supple, trachea midline, no masses.  Respiratory Effort: No intercostal retractions or use of accessory muscles.   Lung Auscultation:      Clear to auscultation bilaterally; no rales, rhonchi or wheezing.  CV:            Regular rate and rhythm. No murmurs, rubs or gallops.  Abd:           Not examined.   Lymphadenopathy: Not examined.  Gait and Station: Normal.  Digits and Nails: No clubbing, cyanosis, petechiae, or nodes.   Cranial Nerves: II-XII grossly intact.  Skin:        No rashes, lesions or ulcers noted.               Ext:           No cyanosis or edema.      Assessment:  1. Centrilobular emphysema (HCC)  PULMONARY FUNCTION TESTS -Test requested: Complete Pulmonary Function Test; Include MIPS/MEPS? No   2. Obstructive sleep apnea  DME Mask and Supplies   3. Pulmonary nodules     4. BMI 34.0-34.9,adult  Height And Weight   5. Former smoker  PULMONARY FUNCTION TESTS -Test requested: Complete Pulmonary Function Test; Include MIPS/MEPS? No       Immunizations:    Flu:will obtain in October  Pneumovax 23:2014  Prevnar 13:2015  COVID-19: recommend    Plan:  1.  Patient COPD is overall well controlled but he would benefit from weight loss and regular exercise to improve his dyspnea.  We will update PFT next office visit.  Continue current bronchodilators.  2.  Patient sleep apnea is well treated.  Continue auto CPAP 5 to 15 cm with 4 L oxygen bleed and nightly.  DME mask/supplies  3.  Discussed sleep and respiratory hygiene  4.  Encourage weight loss through diet and exercise  5.  Follow-up primary care for other health concerns I 6.  Follow-up in 1 year with PFT/compliance  report, sooner if needed.    Please note that this dictation was created using voice recognition software. I have made every reasonable attempt to correct obvious errors, but it is possible there are errors of grammar and possibly content that I did not discover before finalizing the note.

## 2021-09-20 NOTE — LETTER
ERLIN Garcia  [facility]   [address], [address] [place] [address]  Phone: [phone] - Fax:             Encounter Date: 9/20/2021  Provider: ERLIN Garcia  Location of Care: [place] [facility]  [address] [facility] [place] [address]      Patient:   Danielito Tolliver   MR Number: [id number]   YOB: 1940     PROGRESS NOTE:  Chief Complaint   Patient presents with   • Follow-Up     Last Seen 6/17/21   • Apnea     Auto C-pap 5-15       HPI:  Danielito Tolliver is a 81 y.o. year old male here today for follow-up on COPD and JEFF. Hx of pulmonary nodules  Last OV 6/17/21    Since last office visit his inhaler was switched to Breztri 2 puffs twice daily and he feels there is some benefit.  He continues albuterol HFA inhaler as needed but does not use this on a daily basis.  He feels he does have dyspnea with with physical activity.  He is using his stationary bike for exercise but not consistently every day.  He understands the need to have some weight loss.  He has gained slight weight since last office visit.  He did restart Nutrisystem which helped him lose a significant amount of weight previously.  He denies significant cough, phlegm, chest pain, chest tightness or wheezing.  He feels he sleeps well in his device without any issues.  He denies any daytime fatigue.  We do not have compliance to review today but will request from [place].  He continues to note using on a consistent nightly basis greater than 4 hours with benefit.    PULM & SLEEP HX:   Former smoker, quit 1998 with 25PYH.   PFT 5/15/2017 indicates FVC 4.74 L or 135% predicted, FEV1 2.53 L or 100% predicted, FEV1/FVC ratio 53, %, DLCO 90% predicted.   Spirometry 6/5/2019 indicates FEV1 2.06 L or 84%, FEV1/FVC ratio 47%, and mid flows at 37%.  Patient did a significant bronchodilator response.  [place] 11/6/2019 shows  "further decline with FEV1 1.87 L or 77%, FEV1/FVC ratio 51%.  Patient did have a significant bronchodilator response.  I reviewed finds with patient.  PFT 2/25/20 noted FEV1 2.16L or 84%, FEV1/FVC ratio 49, % and DLCO 84%.  PFT 3/11/21 notes FEV1 2.06L or 81%, FEV1/FVC ratio 48, %, and DLCO 78%. No significant bronchodilator response. Reviewed with patient.  Patient underwent heart cath 2017 and started having weight loss. His dyspnea significanly improved and he stopped inhalers.       He has a history of an abnormal CT scan with pulmonary nodules. The nodules were stable for 2 years; last CT was 7/21/2009. CT scan at [facility] 2/23/16 indicates 8mm noncalcified pulmonary nodule in the right middle lobe and severe centrilobular emphysema. Addendum noted stability of nodule and no more imaging warranted. Results reviewed with patient.     HST 2013 indicated an overall AHI of 15 with a minimum oxygen saturation of 77%.  Patient currently uses RESMED auto CPAP 5-15cm H2O with 4 L oxygen bleed in; OBTAINED 2021. He will continue to benefit from night time O2 therapy.  He now has his old device in [place] for when he travels to see his wife.  CNOX 10/17/2015 indicated normal oximetry on that setting.  Compliance report 2/9/2021 through 3/10/2021 notes 100% compliance, average nightly 7 hours 49 minutes, mean pressure 6.1 cm, minimal mask leak with reduced AHI of 1.4/h.      MMRC Grade: 2-3      ROS: As per HPI and otherwise negative if not stated.    Past Medical History:   Diagnosis Date   • Breath shortness     Uses 02 @ 4L per nc at home prn    • CATARACT     IOL OU   • Cold     coughing up \"dirty\" sputum   • Dental disorder     Full Upper   • Emphysema of lung (HCC)    • Hypercholesteremia    • Indigestion    • Infection     left knee post TKA   • JEFF (obstructive sleep apnea)     AUTO CPAP 5-15CM WITH O2 4 LPM   • Pain     low back pain left side   • Psychiatric problem     takes med for depression   • " "Snoring     Uses CPAP       Past Surgical History:   Procedure Laterality Date   • KNEE ARTHROTOMY Left 3/24/2016    Procedure: KNEE ARTHROTOMY-HARDWARE REMOVAL AND HETEROTOPIC OSSIFICATION;  Surgeon: Yasmani Bradley M.D.;  Location: SURGERY [facility];  Service:    • OTHER ORTHOPEDIC SURGERY  3-21-16    \"Knee ReplacementSurgeries Left kneex3 Rightx2\"    • OTHER CARDIAC SURGERY      \"Open Heart-Coronary Artery Bypass, Heart Stent\"   • BRONCHOSCOPY-ENDO  3/25/2009    Performed by SINCERE PABLO at SURGERY [facility]   • CATARACT EXTRACTION WITH IOL      B/L   • OTHER      TKA B/L       History reviewed. No pertinent family history.    Social History     Socioeconomic History   • Marital status:      Spouse name: Not on file   • Number of children: Not on file   • Years of education: Not on file   • Highest education level: Not on file   Occupational History   • Not on file   Tobacco Use   • Smoking status: Former Smoker     Packs/day: 1.00     Years: 25.00     Pack years: 25.00     Types: Cigarettes     Quit date: 1998     Years since quittin.7   • Smokeless tobacco: Never Used   Substance and Sexual Activity   • Alcohol use: No     Alcohol/week: 0.0 oz   • Drug use: No   • Sexual activity: Not on file   Other Topics Concern   • Not on file   Social History Narrative   • Not on file     Social Determinants of Health     Financial Resource Strain:    • Difficulty of Paying Living Expenses:    Food Insecurity:    • Worried About Running Out of Food in the Last Year:    • Ran Out of Food in the Last Year:    Transportation Needs:    • Lack of Transportation (Medical):    • Lack of Transportation (Non-Medical):    Physical Activity:    • Days of Exercise per Week:    • Minutes of Exercise per Session:    Stress:    • Feeling of Stress :    Social Connections:    • Frequency of Communication with Friends and Family:    • Frequency of Social Gatherings with Friends and Family:    • " "Attends Restoration Services:    • Active Member of Clubs or Organizations:    • Attends Club or Organization Meetings:    • Marital Status:    Intimate Partner Violence:    • Fear of Current or Ex-Partner:    • Emotionally Abused:    • Physically Abused:    • Sexually Abused:        Allergies as of 09/20/2021 - Reviewed 09/20/2021   Allergen Reaction Noted   • Morphine Anxiety 03/21/2016        Vitals:  /68 (BP Location: Right arm, Patient Position: Sitting, BP Cuff Size: Large adult)   Pulse 80   Temp 36.7 °C (98 °F) (Temporal)   Resp 18   Ht 1.727 m (5' 8\")   Wt 104 kg (230 lb)   SpO2 97%     Current medications as of today   Current Outpatient Medications   Medication Sig Dispense Refill   • prasugrel (EFFIENT) 10 MG Tab Take 10 mg by mouth every day.     • tamsulosin (FLOMAX) 0.4 MG capsule 1 capsule daily     • sertraline (ZOLOFT) 50 MG Tab Take 50 mg by mouth every day.     • Budeson-Glycopyrrol-Formoterol (BREZTRI AEROSPHERE) 160-9-4.8 MCG/ACT Aerosol Inhale 2 Puffs 2 times a day. 10.7 g 5   • aspirin 81 MG tablet Take 81 mg by mouth every day.     • simvastatin (ZOCOR) 80 MG tablet Take 80 mg by mouth every day.     • Multiple Vitamins-Minerals (OCUVITE PO) Take 1 Cap by mouth every day.     • Multiple Vitamins-Minerals (CENTRUM SILVER ADULT 50+ PO) Take 1 Tab by mouth every day.     • Omega-3 Fatty Acids (FISH OIL PO) Take 1 Cap by mouth every day.     • Coenzyme Q10 (COQ-10) 100 MG Cap Take 1 Cap by mouth every day.     • Ascorbic Acid (VITAMIN C PO) Take 500 mg by mouth every day.     • lisinopril (PRINIVIL) 5 MG Tab Take 5 mg by mouth every day.     • Cobalamine Combinations (VITAMIN B12-FOLIC ACID PO) Take 1 Tab by mouth every day.     • CEPHALEXIN 500 MG PO CAPS Take 1,000 mg by mouth every day. For an infection in the leg for 7 years.     • ascorbic acid (VITAMIN C) 500 MG tablet 1 tablet Orally As Needed     • nitroglycerin (NITROSTAT) 0.4 MG SL Tab 1 tablet under the tongue and allow to " dissolve as needed Sublingual every 5 min as needed for severe chest pain     • Respiratory Therapy Supplies (CARETOUCH 2 CPAP HOSE ) Misc CPAP Machine 3     • albuterol 108 (90 Base) MCG/ACT Aero Soln inhalation aerosol INHALE 2 PUFFS BY MOUTH EVERY 6 HOURS AS NEEDED FOR SHORTNESS OF BREATH 3 Each 3     No current facility-administered medications for this visit.         Physical Exam:   Gen:           Alert and oriented, No apparent distress. Mood and affect appropriate, normal interaction with examiner.  Eyes:          PERRL, EOM intact, sclere white, conjunctive moist.  Ears:          Not examined.   Hearing:     Grossly intact.  Nose:          Normal, no lesions or deformities.  Dentition:    Mask.  Oropharynx:   Mask.  Mallampati Classification: mask  Neck:        Supple, trachea midline, no masses.  Respiratory Effort: No intercostal retractions or use of accessory muscles.   Lung Auscultation:      Clear to auscultation bilaterally; no rales, rhonchi or wheezing.  CV:            Regular rate and rhythm. No murmurs, rubs or gallops.  Abd:           Not examined.   Lymphadenopathy: Not examined.  Gait and Station: Normal.  Digits and Nails: No clubbing, cyanosis, petechiae, or nodes.   Cranial Nerves: II-XII grossly intact.  Skin:        No rashes, lesions or ulcers noted.               Ext:           No cyanosis or edema.      Assessment:  1. Centrilobular emphysema (HCC)  PULMONARY FUNCTION TESTS -Test requested: Complete Pulmonary Function Test; Include MIPS/MEPS? No   2. Obstructive sleep apnea  DME Mask and Supplies   3. Pulmonary nodules     4. BMI 34.0-34.9,adult  Height And Weight   5. Former smoker  PULMONARY FUNCTION TESTS -Test requested: Complete Pulmonary Function Test; Include MIPS/MEPS? No       Immunizations:    Flu:will obtain in October  Pneumovax 23:2014  Prevnar 13:2015  COVID-19: recommend    Plan:  1.  Patient COPD is overall well controlled but he would benefit from weight loss and  regular exercise to improve his dyspnea.  We will update PFT next office visit.  Continue current bronchodilators.  2.  Patient sleep apnea is well treated.  Continue auto CPAP 5 to 15 cm with 4 L oxygen bleed and nightly.  DME mask/supplies  3.  Discussed sleep and respiratory hygiene  4.  Encourage weight loss through diet and exercise  5.  Follow-up primary care for other health concerns I 6.  Follow-up in 1 year with PFT/compliance report, sooner if needed.    Please note that this dictation was created using voice recognition software. I have made every reasonable attempt to correct obvious errors, but it is possible there are errors of grammar and possibly content that I did not discover before finalizing the note.          Electronically signed by Charmaine Martin A.P.R.[place]ius  on 09/20/21    S Jemal Crouch M.D.  [address] [place] [phone]  Via Fax: [phone]

## 2022-03-11 ENCOUNTER — HOSPITAL ENCOUNTER (OUTPATIENT)
Dept: RADIOLOGY | Facility: MEDICAL CENTER | Age: 82
End: 2022-03-11
Attending: UROLOGY
Payer: COMMERCIAL

## 2022-03-11 DIAGNOSIS — C61 MALIGNANT NEOPLASM OF PROSTATE (HCC): ICD-10-CM

## 2022-03-11 PROCEDURE — 78815 PET IMAGE W/CT SKULL-THIGH: CPT | Mod: MH

## 2022-08-22 ENCOUNTER — OFFICE VISIT (OUTPATIENT)
Dept: SLEEP MEDICINE | Facility: MEDICAL CENTER | Age: 82
End: 2022-08-22
Payer: COMMERCIAL

## 2022-08-22 VITALS
RESPIRATION RATE: 16 BRPM | SYSTOLIC BLOOD PRESSURE: 108 MMHG | DIASTOLIC BLOOD PRESSURE: 70 MMHG | BODY MASS INDEX: 33.04 KG/M2 | OXYGEN SATURATION: 92 % | HEART RATE: 64 BPM | WEIGHT: 218 LBS | HEIGHT: 68 IN

## 2022-08-22 DIAGNOSIS — J96.11 CHRONIC RESPIRATORY FAILURE WITH HYPOXIA (HCC): ICD-10-CM

## 2022-08-22 DIAGNOSIS — J43.2 CENTRILOBULAR EMPHYSEMA (HCC): Chronic | ICD-10-CM

## 2022-08-22 DIAGNOSIS — Z87.891 FORMER SMOKER: ICD-10-CM

## 2022-08-22 DIAGNOSIS — G47.33 OBSTRUCTIVE SLEEP APNEA: Chronic | ICD-10-CM

## 2022-08-22 DIAGNOSIS — R91.8 PULMONARY NODULES: Chronic | ICD-10-CM

## 2022-08-22 PROCEDURE — 94761 N-INVAS EAR/PLS OXIMETRY MLT: CPT | Performed by: NURSE PRACTITIONER

## 2022-08-22 PROCEDURE — 99214 OFFICE O/P EST MOD 30 MIN: CPT | Mod: 25 | Performed by: NURSE PRACTITIONER

## 2022-08-22 ASSESSMENT — PATIENT HEALTH QUESTIONNAIRE - PHQ9: CLINICAL INTERPRETATION OF PHQ2 SCORE: 0

## 2022-08-22 NOTE — PATIENT INSTRUCTIONS
Call [employer] to check on cost of portable device to use with other CPAP at night and for daytime use    Start using Breztri 2 puffs twice daily with spacer    Continue riding bike    Overnight oxygen [facility] at [facility]; call to schedule to get test; call my office for results

## 2022-08-22 NOTE — LETTER
ERLIN Garcia  [facility]   [address], [address] [place] [address]  Phone: [phone] - Fax:             Encounter Date: 8/22/2022  Provider: ERLIN Garcia  Location of Care: [place] [facility]  [address] [facility] [place] [address]      Patient:   Danielito Tolliver   MR Number: [id number]   YOB: 1940     PROGRESS NOTE:  Chief Complaint   Patient presents with   • Follow-Up     COPD / JEFF // last seen 9/20/2021        HPI:  Danielito Tolliver is a 81 y.o. year old male here today for follow-up on JEFF, COPD and hx of pulmonary nodules that were stable.  Last OV 9/20/21     His daughter accompanies him today.    MMRC stGstrstastdstest:st st1st Exacerbations this year: 0, covid-19 July; given paxlovid and dexamethasone from pcp    Using Breztri 2 puffs twice daily and RESHMA as needed. Rare use of RESHMA.  PFT has not been updated.  He notes his breathing to be worse after COVID-19 but has slowly continued to improve.  He admits to not using his inhalers as prescribed as well.  We reviewed this in depth.  In the last week he fell into a ditch onto his left side with some rib pain.  He did not report to the urgent care or ER.  He notes some discomfort with breathing but it has slowly improved each day.  He declines chest x-ray or further evaluation.  His daughter notes that when he was put on CPAP with his O2 when he had COVID-19 that his mentation overall manner improved.  He was suffering from brain fog, memory issues and fatigue when he had COVID-19.  They are curious about his daytime oxygen needs.  He recently resumed a bike in the last 2 days and only able to maintain 11 minutes of exercise.  His ox saturations did drop as low as 87%.  He notes having an occasional cough for worse in the morning also continues during the day with some minimal thick phlegm.  He feels this started 1 week ago.  No chest pain or  "wheezing.  No shortness of breath with activity.    He continues to drink beet juice and notes good lab work from PCP.    Patient currently uses RESMED auto CPAP 5-15cm H2O with 4 L oxygen bleed in; OBTAINED 2021. He will continue to benefit from night time O2 therapy.  I do not have wireless access and he did not bring his chip today.  We will request from DME.  He continues to use his device on a nightly basis for 5 to 7 hours.  He feels he sleeps well on therapy without complaints.  He denies morning headaches.  His daughter notes taking \"catnaps\" for 10 to 15 minutes throughout the day if he is sitting on the couch.  She notes he has done this most of her life.  He does have 2 CPAP's with 1 but does not have oxygen bleed in.  He would like to obtain a device for bleed in O2 when he travels.  He does go to [place] periodically.    PULM & SLEEP HX:   Former smoker, quit 1998 with 25PYH.   PFT 5/15/2017 indicates FVC 4.74 L or 135% predicted, FEV1 2.53 L or 100% predicted, FEV1/FVC ratio 53, %, DLCO 90% predicted.   Spirometry 6/5/2019 indicates FEV1 2.06 L or 84%, FEV1/FVC ratio 47%, and mid flows at 37%.  Patient did a significant bronchodilator response.  [place] 11/6/2019 shows further decline with FEV1 1.87 L or 77%, FEV1/FVC ratio 51%.  Patient did have a significant bronchodilator response.  I reviewed finds with patient.  PFT 2/25/20 noted FEV1 2.16L or 84%, FEV1/FVC ratio 49, % and DLCO 84%.  PFT 3/11/21 notes FEV1 2.06L or 81%, FEV1/FVC ratio 48, %, and DLCO 78%. No significant bronchodilator response. Reviewed with patient.  Patient underwent heart cath 2017 and started having weight loss. His dyspnea significanly improved and he stopped inhalers.       He has a history of an abnormal CT scan with pulmonary nodules. The nodules were stable for 2 years; last CT was 7/21/2009. CT [facility]/23[facility]16 indicates 8mm noncalcified pulmonary nodule in the right middle lobe and severe " "centrilobular emphysema. Addendum noted stability of nodule and no more imaging warranted. Results reviewed with patient.     HST 2013 indicated an overall AHI of 15 with a minimum oxygen saturation of 77%.  Patient currently uses RESMED auto CPAP 5-15cm H2O with 4 L oxygen bleed in; OBTAINED 2021. He will continue to benefit from night time O2 therapy.  He now has his old device in [place] for when he travels to see his wife.  CNOX 10/17/2015 indicated normal oximetry on that setting.  Compliance report 2/9/2021 through 3/10/2021 notes 100% compliance, average nightly 7 hours 49 minutes, mean pressure 6.1 cm, minimal mask leak with reduced AHI of 1.4/h.    ROS: As per HPI and otherwise negative if not stated.    Past Medical History:   Diagnosis Date   • Breath shortness     Uses 02 @ 4L per nc at home prn    • CATARACT     IOL OU   • Cold     coughing up \"dirty\" sputum   • Dental disorder     Full Upper   • Emphysema of lung (HCC)    • Hypercholesteremia    • Indigestion    • Infection     left knee post TKA   • JEFF (obstructive sleep apnea)     AUTO CPAP 5-15CM WITH O2 4 LPM   • Pain     low back pain left side   • Psychiatric problem     takes med for depression   • Snoring     Uses CPAP       Past Surgical History:   Procedure Laterality Date   • KNEE ARTHROTOMY Left 3/24/2016    Procedure: KNEE ARTHROTOMY-HARDWARE REMOVAL AND HETEROTOPIC OSSIFICATION;  Surgeon: Yasmani Bradley M.D.;  Location: SURGERY [facility];  Service:    • OTHER ORTHOPEDIC SURGERY  3-21-16    \"Knee ReplacementSurgeries Left kneex3 Rightx2\"    • OTHER CARDIAC SURGERY  2013    \"Open Heart-Coronary Artery Bypass, Heart Stent\"   • BRONCHOSCOPY-ENDO  3/25/2009    Performed by SINCERE PABLO at [facility]   • CATARACT EXTRACTION WITH IOL      B/L   • OTHER      TKA B/L       History reviewed. No pertinent family history.    Social History     Socioeconomic History   • Marital status:      Spouse name: Not on file   • " "Number of children: Not on file   • Years of education: Not on file   • Highest education level: Not on file   Occupational History   • Not on file   Tobacco Use   • Smoking status: Former     Packs/day: 1.00     Years: 25.00     Pack years: 25.00     Types: Cigarettes     Quit date: 1998     Years since quittin.6   • Smokeless tobacco: Never   Substance and Sexual Activity   • Alcohol use: No     Alcohol/week: 0.0 oz   • Drug use: No   • Sexual activity: Not on file   Other Topics Concern   • Not on file   Social History Narrative   • Not on file     Social Determinants of Health     Financial Resource Strain: Not on file   Food Insecurity: Not on file   Transportation Needs: Not on file   Physical Activity: Not on file   Stress: Not on file   Social Connections: Not on file   Intimate Partner Violence: Not on file   Housing Stability: Not on file       Allergies as of 2022 - Reviewed 2022   Allergen Reaction Noted   • Morphine Anxiety 2016        Vitals:  /70 (BP Location: Left arm, Patient Position: Sitting, BP Cuff Size: Adult)   Pulse 64   Resp 16   Ht 1.727 m (5' 8\")   Wt 98.9 kg (218 lb)   SpO2 92%     Current medications as of today   Current Outpatient Medications   Medication Sig Dispense Refill   • ascorbic acid (VITAMIN C) 500 MG tablet 1 tablet Orally As Needed     • nitroglycerin (NITROSTAT) 0.4 MG SL Tab 1 tablet under the tongue and allow to dissolve as needed Sublingual every 5 min as needed for severe chest pain     • prasugrel (EFFIENT) 10 MG Tab Take 10 mg by mouth every day.     • Respiratory Therapy Supplies (CARETOUCH 2 CPAP HOSE ) Misc CPAP Machine 3     • tamsulosin (FLOMAX) 0.4 MG capsule 1 capsule daily     • sertraline (ZOLOFT) 50 MG Tab Take 50 mg by mouth every day.     • Budeson-Glycopyrrol-Formoterol (BREZTRI AEROSPHERE) 160-9-4.8 MCG/ACT Aerosol Inhale 2 Puffs 2 times a day. 10.7 g 5   • albuterol 108 (90 Base) MCG/ACT Aero Soln inhalation " aerosol INHALE 2 PUFFS BY MOUTH EVERY 6 HOURS AS NEEDED FOR SHORTNESS OF BREATH 3 Each 3   • aspirin 81 MG tablet Take 81 mg by mouth every day.     • simvastatin (ZOCOR) 80 MG tablet Take 80 mg by mouth every day.     • Multiple Vitamins-Minerals (OCUVITE PO) Take 1 Cap by mouth every day.     • Multiple Vitamins-Minerals (CENTRUM SILVER ADULT 50+ PO) Take 1 Tab by mouth every day.     • Omega-3 Fatty Acids (FISH OIL PO) Take 1 Cap by mouth every day.     • Coenzyme Q10 (COQ-10) 100 MG Cap Take 1 Cap by mouth every day.     • Ascorbic Acid (VITAMIN C PO) Take 500 mg by mouth every day.     • lisinopril (PRINIVIL) 5 MG Tab Take 5 mg by mouth every day.     • Cobalamine Combinations (VITAMIN B12-FOLIC ACID PO) Take 1 Tab by mouth every day.     • CEPHALEXIN 500 MG PO CAPS Take 1,000 mg by mouth every day. For an infection in the leg for 7 years.       No current facility-administered medications for this visit.         Physical Exam:   Gen:           Alert and oriented, No apparent distress. Mood and affect appropriate, normal interaction with examiner.  Eyes:          PERRL, EOM intact, sclere white, conjunctive moist.  Ears:          Not examined.   Hearing:     Grossly intact.  Nose:          Normal, no lesions or deformities.  Dentition:    Mask.  Oropharynx:   Mask.  Mallampati Classification: mask  Neck:        Supple, trachea midline, no masses.  Respiratory Effort: No intercostal retractions or use of accessory muscles.   Lung Auscultation:      Clear to auscultation bilaterally; no rales, rhonchi or wheezing.  CV:            Regular rate and rhythm. No murmurs, rubs or gallops.  Abd:           Not examined.   Lymphadenopathy: Not examined.  Gait and Station: Normal.  Digits and Nails: No clubbing, cyanosis, petechiae, or nodes.   Cranial Nerves: II-XII grossly intact.  Skin:        No rashes, lesions or ulcers noted.               Ext:           No cyanosis or edema.      Assessment:  1. Centrilobular  emphysema (HCC)        2. Pulmonary nodules        3. Obstructive sleep apnea        4. BMI 33.0-33.9,adult  HEIGHT AND WEIGHT      5. Former smoker            Immunizations:    Flu:recommend in the fall  Pneumovax 23:2014  Prevnar 13:2015  PCV 20: not due  COVID-19: recommend    Plan:  1. Patient COPD is clinically stable able but is having intermittent cough.  Recommend maximizing bronchodilator therapy by using Breztri 2 puffs twice daily with spacer.  He has had rare use of RESHMA but reviewed appropriate use.  He notes his rib pain is improving and he is able to take a deep breath.  He declines going to urgent care or pursuing chest x-ray.  Spacer provided in office with instruction.  Multi ox in office indicated normal saturations  PFT at next office visit  2. Continue CPAP with 4 L oxygen bleed in O2.  Will perform overnight oximetry to verify his saturations are adequate.  DME CNOX on auto CPAP 5 to 15 cm of 4 L oxygen bleed in; patient will call for results.  Pending results make adjustments in therapy.  DME mask/supplies  He will contact [employer] to discuss obtaining an oxygen device for nighttime use bleed and with his other CPAP machine at his second residence.  He will have them fax me paperwork.  3. Discussed sleep and respiratory hygiene.  May resume regular exercise but patient is to pace himself.  4. Encourage weight loss through healthy eating and regular activity  5. Follow-up in 2 months to review respiratory symptoms with PFT results and sleep compliance with CNOX results, sooner if needed.    Please note that this dictation was created using voice recognition software. I have made every reasonable attempt to correct obvious errors, but it is possible there are errors of grammar and possibly content that I did not discover before finalizing the note.            Electronically signed by ERLIN Garcia  on 08/22/22    BEVERLY Crouch M.D.  [address] [place]  [phone]  Via Fax: [phone]

## 2022-08-22 NOTE — PROGRESS NOTES
Chief Complaint   Patient presents with    Follow-Up     COPD / JEFF // last seen 9/20/2021        HPI:  Danielito Tolliver is a 81 y.o. year old male here today for follow-up on JEFF, COPD and hx of pulmonary nodules that were stable.  Last OV 9/20/21     His daughter accompanies him today.    MMRC rdGrdrrdarddrderd:rd rd3rd Exacerbations this year: 0, covid-19 July; given paxlovid and dexamethasone from pcp    Using Breztri 2 puffs twice daily and RESHMA as needed. Rare use of RESHMA.  PFT has not been updated.  He notes his breathing to be worse after COVID-19 but has slowly continued to improve.  He admits to not using his inhalers as prescribed as well.  We reviewed this in depth.  In the last week he fell into a ditch onto his left side with some rib pain.  He did not report to the urgent care or ER.  He notes some discomfort with breathing but it has slowly improved each day.  He declines chest x-ray or further evaluation.  His daughter notes that when he was put on CPAP with his O2 when he had COVID-19 that his mentation overall manner improved.  He was suffering from brain fog, memory issues and fatigue when he had COVID-19.  They are curious about his daytime oxygen needs.  He recently resumed a bike in the last 2 days and only able to maintain 11 minutes of exercise.  His ox saturations did drop as low as 87%.  He notes having an occasional cough for worse in the morning also continues during the day with some minimal thick phlegm.  He feels this started 1 week ago.  No chest pain or wheezing.  No shortness of breath with activity.    He continues to drink beet juice and notes good lab work from PCP.    Patient currently uses RESMED auto CPAP 5-15cm H2O with 4 L oxygen bleed in; OBTAINED 2021. He will continue to benefit from night time O2 therapy.  I do not have wireless access and he did not bring his chip today.  We will request from DME.  He continues to use his device on a nightly basis for 5 to 7 hours.  He feels he sleeps  "well on therapy without complaints.  He denies morning headaches.  His daughter notes taking \"catnaps\" for 10 to 15 minutes throughout the day if he is sitting on the couch.  She notes he has done this most of her life.  He does have 2 CPAP's with 1 but does not have oxygen bleed in.  He would like to obtain a device for bleed in O2 when he travels.  He does go to [place] periodically.    PULM & SLEEP HX:   Former smoker, quit 1998 with 25PYH.   PFT 5/15/2017 indicates FVC 4.74 L or 135% predicted, FEV1 2.53 L or 100% predicted, FEV1/FVC ratio 53, %, DLCO 90% predicted.   Spirometry 6/5/2019 indicates FEV1 2.06 L or 84%, FEV1/FVC ratio 47%, and mid flows at 37%.  Patient did a significant bronchodilator response.  Cipriano 11/6/2019 shows further decline with FEV1 1.87 L or 77%, FEV1/FVC ratio 51%.  Patient did have a significant bronchodilator response.  I reviewed finds with patient.  PFT 2/25/20 noted FEV1 2.16L or 84%, FEV1/FVC ratio 49, % and DLCO 84%.  PFT 3/11/21 notes FEV1 2.06L or 81%, FEV1/FVC ratio 48, %, and DLCO 78%. No significant bronchodilator response. Reviewed with patient.  Patient underwent heart cath 2017 and started having weight loss. His dyspnea significanly improved and he stopped inhalers.       He has a history of an abnormal CT scan with pulmonary nodules. The nodules were stable for 2 years; last CT was 7/21/2009. CT scan at [facility] 2/23/16 indicates 8mm noncalcified pulmonary nodule in the right middle lobe and severe centrilobular emphysema. Addendum noted stability of nodule and no more imaging warranted. Results reviewed with patient.     HST 2013 indicated an overall AHI of 15 with a minimum oxygen saturation of 77%.  Patient currently uses RESMED auto CPAP 5-15cm H2O with 4 L oxygen bleed in; OBTAINED 2021. He will continue to benefit from night time O2 therapy.  He now has his old device in [place] for when he travels to see his wife.  CNOX 10/17/2015 indicated normal " "oximetry on that setting.  Compliance report 2021 through 3/10/2021 notes 100% compliance, average nightly 7 hours 49 minutes, mean pressure 6.1 cm, minimal mask leak with reduced AHI of 1.4/h.    ROS: As per HPI and otherwise negative if not stated.    Past Medical History:   Diagnosis Date    Breath shortness     Uses 02 @ 4L per nc at home prn     CATARACT     IOL OU    Cold     coughing up \"dirty\" sputum    Dental disorder     Full Upper    Emphysema of lung (HCC)     Hypercholesteremia     Indigestion     Infection     left knee post TKA    JEFF (obstructive sleep apnea)     AUTO CPAP 5-15CM WITH O2 4 LPM    Pain     low back pain left side    Psychiatric problem     takes med for depression    Snoring     Uses CPAP       Past Surgical History:   Procedure Laterality Date    KNEE ARTHROTOMY Left 3/24/2016    Procedure: KNEE ARTHROTOMY-HARDWARE REMOVAL AND HETEROTOPIC OSSIFICATION;  Surgeon: Yasmani Bradley M.D.;  Location: SURGERY [facility];  Service:     OTHER ORTHOPEDIC SURGERY  3-21-16    \"Knee ReplacementSurgeries Left kneex3 Rightx2\"     OTHER CARDIAC SURGERY      \"Open Heart-Coronary Artery Bypass, Heart Stent\"    BRONCHOSCOPY-ENDO  3/25/2009    Performed by SINCERE PABLO at SURGERY [facility]    CATARACT EXTRACTION WITH IOL      B/L    OTHER      TKA B/L       History reviewed. No pertinent family history.    Social History     Socioeconomic History    Marital status:      Spouse name: Not on file    Number of children: Not on file    Years of education: Not on file    Highest education level: Not on file   Occupational History    Not on file   Tobacco Use    Smoking status: Former     Packs/day: 1.00     Years: 25.00     Pack years: 25.00     Types: Cigarettes     Quit date: 1998     Years since quittin.6    Smokeless tobacco: Never   Substance and Sexual Activity    Alcohol use: No     Alcohol/week: 0.0 oz    Drug use: No    Sexual activity: Not on file   Other " "Topics Concern    Not on file   Social History Narrative    Not on file     Social Determinants of Health     Financial Resource Strain: Not on file   Food Insecurity: Not on file   Transportation Needs: Not on file   Physical Activity: Not on file   Stress: Not on file   Social Connections: Not on file   Intimate Partner Violence: Not on file   Housing Stability: Not on file       Allergies as of 08/22/2022 - Reviewed 08/22/2022   Allergen Reaction Noted    Morphine Anxiety 03/21/2016        Vitals:  /70 (BP Location: Left arm, Patient Position: Sitting, BP Cuff Size: Adult)   Pulse 64   Resp 16   Ht 1.727 m (5' 8\")   Wt 98.9 kg (218 lb)   SpO2 92%     Current medications as of today   Current Outpatient Medications   Medication Sig Dispense Refill    ascorbic acid (VITAMIN C) 500 MG tablet 1 tablet Orally As Needed      nitroglycerin (NITROSTAT) 0.4 MG SL Tab 1 tablet under the tongue and allow to dissolve as needed Sublingual every 5 min as needed for severe chest pain      prasugrel (EFFIENT) 10 MG Tab Take 10 mg by mouth every day.      Respiratory Therapy Supplies (CARETOUCH 2 CPAP HOSE ) Misc CPAP Machine 3      tamsulosin (FLOMAX) 0.4 MG capsule 1 capsule daily      sertraline (ZOLOFT) 50 MG Tab Take 50 mg by mouth every day.      Budeson-Glycopyrrol-Formoterol (BREZTRI AEROSPHERE) 160-9-4.8 MCG/ACT Aerosol Inhale 2 Puffs 2 times a day. 10.7 g 5    albuterol 108 (90 Base) MCG/ACT Aero Soln inhalation aerosol INHALE 2 PUFFS BY MOUTH EVERY 6 HOURS AS NEEDED FOR SHORTNESS OF BREATH 3 Each 3    aspirin 81 MG tablet Take 81 mg by mouth every day.      simvastatin (ZOCOR) 80 MG tablet Take 80 mg by mouth every day.      Multiple Vitamins-Minerals (OCUVITE PO) Take 1 Cap by mouth every day.      Multiple Vitamins-Minerals (CENTRUM SILVER ADULT 50+ PO) Take 1 Tab by mouth every day.      Omega-3 Fatty Acids (FISH OIL PO) Take 1 Cap by mouth every day.      Coenzyme Q10 (COQ-10) 100 MG Cap Take 1 Cap " by mouth every day.      Ascorbic Acid (VITAMIN C PO) Take 500 mg by mouth every day.      lisinopril (PRINIVIL) 5 MG Tab Take 5 mg by mouth every day.      Cobalamine Combinations (VITAMIN B12-FOLIC ACID PO) Take 1 Tab by mouth every day.      CEPHALEXIN 500 MG PO CAPS Take 1,000 mg by mouth every day. For an infection in the leg for 7 years.       No current facility-administered medications for this visit.         Physical Exam:   Gen:           Alert and oriented, No apparent distress. Mood and affect appropriate, normal interaction with examiner.  Eyes:          PERRL, EOM intact, sclere white, conjunctive moist.  Ears:          Not examined.   Hearing:     Grossly intact.  Nose:          Normal, no lesions or deformities.  Dentition:    Mask.  Oropharynx:   Mask.  Mallampati Classification: mask  Neck:        Supple, trachea midline, no masses.  Respiratory Effort: No intercostal retractions or use of accessory muscles.   Lung Auscultation:      Clear to auscultation bilaterally; no rales, rhonchi or wheezing.  CV:            Regular rate and rhythm. No murmurs, rubs or gallops.  Abd:           Not examined.   Lymphadenopathy: Not examined.  Gait and Station: Normal.  Digits and Nails: No clubbing, cyanosis, petechiae, or nodes.   Cranial Nerves: II-XII grossly intact.  Skin:        No rashes, lesions or ulcers noted.               Ext:           No cyanosis or edema.      Assessment:  1. Centrilobular emphysema (HCC)        2. Pulmonary nodules        3. Obstructive sleep apnea        4. BMI 33.0-33.9,adult  HEIGHT AND WEIGHT      5. Former smoker            Immunizations:    Flu:recommend in the fall  Pneumovax 23:2014  Prevnar 13:2015  PCV 20: not due  COVID-19: recommend    Plan:  Patient COPD is clinically stable able but is having intermittent cough.  Recommend maximizing bronchodilator therapy by using Breztri 2 puffs twice daily with spacer.  He has had rare use of RESHMA but reviewed appropriate use.   He notes his rib pain is improving and he is able to take a deep breath.  He declines going to urgent care or pursuing chest x-ray.  Spacer provided in office with instruction.  Multi ox in office indicated normal saturations  PFT at next office visit  Continue CPAP with 4 L oxygen bleed in O2.  Will perform overnight oximetry to verify his saturations are adequate.  DME CNOX on auto CPAP 5 to 15 cm of 4 L oxygen bleed in; patient will call for results.  Pending results make adjustments in therapy.  DME mask/supplies  He will contact [employer] to discuss obtaining an oxygen device for nighttime use bleed and with his other CPAP machine at his second residence.  He will have them fax me paperwork.  Discussed sleep and respiratory hygiene.  May resume regular exercise but patient is to pace himself.  Encourage weight loss through healthy eating and regular activity  Follow-up in 2 months to review respiratory symptoms with PFT results and sleep compliance with CNOX results, sooner if needed.    Please note that this dictation was created using voice recognition software. I have made every reasonable attempt to correct obvious errors, but it is possible there are errors of grammar and possibly content that I did not discover before finalizing the note.       Glycopyrrolate Pregnancy And Lactation Text: This medication is Pregnancy Category B and is considered safe during pregnancy. It is unknown if it is excreted breast milk.

## 2022-08-22 NOTE — PROCEDURES
Multi-Ox Readings  Multi Ox #1 Room air   O2 sat % at rest 94   O2 sat % on exertion 91   O2 sat average on exertion     Multi Ox #2     O2 sat % at rest     O2 sat % on exertion     O2 sat average on exertion       Oxygen Use     Oxygen Frequency     Duration of need     Is the patient mobile within the home?     CPAP Use? auto CPAP 5 to 15 cm with 4 L oxygen bleed   BIPAP Use?     Servo Titration

## 2022-11-30 ENCOUNTER — NON-PROVIDER VISIT (OUTPATIENT)
Dept: SLEEP MEDICINE | Facility: MEDICAL CENTER | Age: 82
End: 2022-11-30
Attending: NURSE PRACTITIONER
Payer: COMMERCIAL

## 2022-11-30 ENCOUNTER — OFFICE VISIT (OUTPATIENT)
Dept: SLEEP MEDICINE | Facility: MEDICAL CENTER | Age: 82
End: 2022-11-30
Payer: COMMERCIAL

## 2022-11-30 VITALS
OXYGEN SATURATION: 95 % | HEIGHT: 66 IN | HEART RATE: 87 BPM | WEIGHT: 222 LBS | SYSTOLIC BLOOD PRESSURE: 120 MMHG | DIASTOLIC BLOOD PRESSURE: 60 MMHG | RESPIRATION RATE: 16 BRPM | BODY MASS INDEX: 35.68 KG/M2

## 2022-11-30 VITALS — BODY MASS INDEX: 33.75 KG/M2 | WEIGHT: 222 LBS

## 2022-11-30 DIAGNOSIS — Z87.891 FORMER SMOKER: ICD-10-CM

## 2022-11-30 DIAGNOSIS — J43.2 CENTRILOBULAR EMPHYSEMA (HCC): Chronic | ICD-10-CM

## 2022-11-30 DIAGNOSIS — J44.9 CHRONIC OBSTRUCTIVE PULMONARY DISEASE, UNSPECIFIED COPD TYPE (HCC): ICD-10-CM

## 2022-11-30 DIAGNOSIS — R91.8 PULMONARY NODULES: Chronic | ICD-10-CM

## 2022-11-30 DIAGNOSIS — G47.33 OBSTRUCTIVE SLEEP APNEA: Chronic | ICD-10-CM

## 2022-11-30 DIAGNOSIS — R09.02 HYPOXIA: ICD-10-CM

## 2022-11-30 PROCEDURE — 94761 N-INVAS EAR/PLS OXIMETRY MLT: CPT | Performed by: NURSE PRACTITIONER

## 2022-11-30 PROCEDURE — 94726 PLETHYSMOGRAPHY LUNG VOLUMES: CPT | Performed by: INTERNAL MEDICINE

## 2022-11-30 PROCEDURE — 94729 DIFFUSING CAPACITY: CPT | Performed by: INTERNAL MEDICINE

## 2022-11-30 PROCEDURE — 94060 EVALUATION OF WHEEZING: CPT | Performed by: INTERNAL MEDICINE

## 2022-11-30 PROCEDURE — 99214 OFFICE O/P EST MOD 30 MIN: CPT | Mod: 25 | Performed by: NURSE PRACTITIONER

## 2022-11-30 RX ORDER — FUROSEMIDE 20 MG/1
TABLET ORAL
COMMUNITY
Start: 2022-11-17

## 2022-11-30 ASSESSMENT — PULMONARY FUNCTION TESTS
FVC: 3.5
FEV1_PERCENT_CHANGE: 3
FEV1/FVC: 49.72
FVC_PERCENT_PREDICTED: 109
FEV1/FVC_PERCENT_PREDICTED: 66
FEV1/FVC_PERCENT_PREDICTED: 74
FEV1_PERCENT_CHANGE: 13
FEV1/FVC: 45
FVC_PREDICTED: 3.32
FEV1/FVC_PERCENT_PREDICTED: 66
FEV1/FVC_PERCENT_CHANGE: 9
FEV1/FVC_PERCENT_PREDICTED: 61
FEV1/FVC: 45
FVC_PERCENT_PREDICTED: 105
FEV1_LLN: 2.07
FEV1/FVC_PERCENT_LLN: 63
FEV1_PERCENT_PREDICTED: 64
FVC_LLN: 2.77
FEV1_PREDICTED: 2.47
FEV1/FVC_PERCENT_CHANGE: 433
FEV1/FVC_PERCENT_PREDICTED: 60
FVC: 3.62
FEV1: 1.8
FEV1/FVC: 50
FEV1_PERCENT_PREDICTED: 72
FEV1: 1.59
FEV1/FVC_PREDICTED: 75

## 2022-11-30 NOTE — PROCEDURES
Technician: MO Mcknight    Technician Comment:  Good patient effort & cooperation.  The results of this test meet the ATS/ERS standards for acceptability & reproducibility.  Test was performed on the [employer] Body Plethysmograph-Elite DX system.  Predicted values were GLI-2012 for spirometry, GLI-2017 for DLCO, ITS for Lung Volumes.  The DLCO was uncorrected for Hgb.  A bronchodilator of Ventolin HFA -2puffs via spacer administered.  DLCO performed during dilation period.    Interpretation:   Baseline spirometry shows airflow obstruction with FEV1/FVC ratio 45 and an FEV1 of 1.59 L or 64% predicted.  There is significant bronchodilator response with improvement in FEV1 to 1.8 L or 72% predicted.  Total lung capacity is within normal limits at 7.05 L or 112% predicted.  There is evidence for mild air trapping at 130% predicted.  Diffusion capacity is reduced at 65% predicted.  Pulmonary function testing shows mild to moderate airflow obstruction with air trapping and reduced DLCO consistent with diagnosis of COPD.

## 2022-11-30 NOTE — LETTER
ERLIN Garcia  [facility] Pulmonary Medicine   [address], [address] [place] [address]  Phone: [phone] - Fax:             Encounter Date: 11/30/2022  Provider: ERLIN Garcia  Location of Care: [place] [facility]  [address] [facility] [place] [address]      Patient:   Danielito Tolliver   MR Number: [id number]   YOB: 1940     PROGRESS NOTE:  Chief Complaint   Patient presents with   • Follow-Up     COPD / JEFF // last seen 8/22/2022    • Results     PFT        HPI:  Danielito Tolliver is a 82 y.o. year old male here today for follow-up on moderate COPD with chronic respiratory failure and JEFF. Hx of pulmonary nodules that were stable.  Last OV 8/22/22    He is accompanied by his daughter today     MMRC rdGrdrrdarddrderd:rd rd3rd Exacerbations this year: 0, history of COVID-19 July 2022 and given pack Slo-Bid and dexamethasone from PCP    PFT results today noted moderate COPD with decline noted from prior PFT March 2021.  FVC 3.5 L 105%, FEV1 1.59 L or 64%, FEV1/FVC ratio 45, %, TLC 7.05 L 112% and DLCO 65% predicted.  He did have a significant bronchodilator response.  Reviewed in detail with patient.  He admits to not using his bronchodilators consistently.  We reviewed proper use in detail.    Patient noted increased shortness of breath with cardiology who ordered CTA of chest 9/20/2022 that noted no PE, pulmonary emphysema and prior 7 to 8 mm nodule present along minor fissure.  This was not compared to prior imaging but appears to be the same nodule based on review of prior reports.  Patient declines any further imaging at this time.  Echo 9/15/2022 noted mild concentric left ventricular hypertrophy, normal left ventricular systolic function with LVEF 60 to 65%.  Mild MR and RVSP 55 to 60 mmHg.  Reviewed with patient.  PET scan 3/11/2022 noted focal FDG accumulation in right prostate noting SUV 11.9.  Focal uptake in right  ischium 6.6.  He is currently under the care of [facility] and receiving injection therapy.    He admits to not using his oxygen on exertion and does own his own portable battery POC.  He notes shortness of breath and having to stop 3-4 times when he goes outside around his mini storage unit.  Once he sits down and sits he recovers quickly.  He continues to ride his stationary bike daily.  He notes cognitive changes and so does his daughter with decline in memory.  He has reviewed this with PCP but they have not pursued medication.    He continues to use auto CPAP 5 to 15 cm with 4 L oxygen bleed and nightly; ResMed device obtained 2021.  Compliance report 10/31/2022 through 11/29/2022 indicates 97% compliance, average nightly use 8 hours 36 minutes, mean pressure 11.2 cm, minimal to moderate mask leak with a reduced AHI of 0.7/h.  Reviewed with patient.  Goes to bed at 10 PM to 10:30 PM and falls asleep within 30 minutes.  He will generally wake between 1 and 3 AM to use the restroom and sometimes unable to resume sleep.  He will watch TV and then resume sleep.  He will wake by 6:30 AM.  He notes having a bad night less than 3 times per week.  He continues to tolerate mask and pressure well.  Denies morning headaches.    PULM & SLEEP HX:   Former smoker, quit 1998 with 25PYH.   PFT 5/15/2017 indicates FVC 4.74 L or 135% predicted, FEV1 2.53 L or 100% predicted, FEV1/FVC ratio 53, %, DLCO 90% predicted.   Spirometry 6/5/2019 indicates FEV1 2.06 L or 84%, FEV1/FVC ratio 47%, and mid flows at 37%.  Patient did a significant bronchodilator response.  [place] 11/6/2019 shows further decline with FEV1 1.87 L or 77%, FEV1/FVC ratio 51%.  Patient did have a significant bronchodilator response.  I reviewed finds with patient.  PFT 2/25/20 noted FEV1 2.16L or 84%, FEV1/FVC ratio 49, % and DLCO 84%.  PFT 3/11/21 notes FEV1 2.06L or 81%, FEV1/FVC ratio 48, %, and DLCO 78%. No significant  "bronchodilator response. Reviewed with patient.  Patient underwent heart cath 2017 and started having weight loss. His dyspnea significanly improved and he stopped inhalers.       He has a history of an abnormal CT scan with pulmonary nodules. The nodules were stable for 2 years; last CT was 7/21/2009. CT scan at [facility] 2/23/16 indicates 8mm noncalcified pulmonary nodule in the right middle lobe and severe centrilobular emphysema. Addendum noted stability of nodule and no more imaging warranted. Results reviewed with patient.     HST 2013 indicated an overall AHI of 15 with a minimum oxygen saturation of 77%.  Patient currently uses RESMED auto CPAP 5-15cm H2O with 4 L oxygen bleed in; OBTAINED 2021. He will continue to benefit from night time O2 therapy.  He now has his old device in [place] for when he travels to see his wife.  CNOX 10/17/2015 indicated normal oximetry on that setting.    ROS: As per HPI and otherwise negative if not stated.    Past Medical History:   Diagnosis Date   • Breath shortness     Uses 02 @ 4L per nc at home prn    • CATARACT     IOL OU   • Cold     coughing up \"dirty\" sputum   • Dental disorder     Full Upper   • Emphysema of lung (HCC)    • Hypercholesteremia    • Indigestion    • Infection     left knee post TKA   • JEFF (obstructive sleep apnea)     AUTO CPAP 5-15CM WITH O2 4 LPM   • Pain     low back pain left side   • Psychiatric problem     takes med for depression   • Snoring     Uses CPAP       Past Surgical History:   Procedure Laterality Date   • KNEE ARTHROTOMY Left 3/24/2016    Procedure: KNEE ARTHROTOMY-HARDWARE REMOVAL AND HETEROTOPIC OSSIFICATION;  Surgeon: Yasmani Bradley M.D.;  Location: SURGERY [facility];  Service:    • OTHER ORTHOPEDIC SURGERY  3-21-16    \"Knee ReplacementSurgeries Left kneex3 Rightx2\"    • OTHER CARDIAC SURGERY  2013    \"Open Heart-Coronary Artery Bypass, Heart Stent\"   • BRONCHOSCOPY-ENDO  3/25/2009    Performed by SINCERE PABLO at [facility] " [facility]   • CATARACT EXTRACTION WITH IOL      B/L   • OTHER      TKA B/L       History reviewed. No pertinent family history.    Social History     Socioeconomic History   • Marital status:      Spouse name: Not on file   • Number of children: Not on file   • Years of education: Not on file   • Highest education level: Not on file   Occupational History   • Not on file   Tobacco Use   • Smoking status: Former     Packs/day: 1.00     Years: 25.00     Pack years: 25.00     Types: Cigarettes     Quit date: 1998     Years since quittin.9   • Smokeless tobacco: Never   Substance and Sexual Activity   • Alcohol use: No     Alcohol/week: 0.0 oz   • Drug use: No   • Sexual activity: Not on file   Other Topics Concern   • Not on file   Social History Narrative   • Not on file     Social Determinants of Health     Financial Resource Strain: Not on file   Food Insecurity: Not on file   Transportation Needs: Not on file   Physical Activity: Not on file   Stress: Not on file   Social Connections: Not on file   Intimate Partner Violence: Not on file   Housing Stability: Not on file       Allergies as of 2022 - Reviewed 2022   Allergen Reaction Noted   • Morphine Anxiety 2016        Vitals:  /60 (BP Location: Left arm, Patient Position: Sitting, BP Cuff Size: Adult)   Pulse 87   Resp 16   Ht 1.676 m (5' 6\")   Wt 101 kg (222 lb)   SpO2 95%     Current medications as of today   Current Outpatient Medications   Medication Sig Dispense Refill   • nitroglycerin (NITROSTAT) 0.4 MG SL Tab 1 tablet under the tongue and allow to dissolve as needed Sublingual every 5 min as needed for severe chest pain     • albuterol 108 (90 Base) MCG/ACT Aero Soln inhalation aerosol INHALE 2 PUFFS BY MOUTH EVERY 6 HOURS AS NEEDED FOR SHORTNESS OF BREATH 3 Each 3   • prasugrel (EFFIENT) 10 MG Tab Take 10 mg by mouth every day.     • Respiratory Therapy Supplies (CARETOUCH 2 CPAP HOSE ) Misc CPAP " Machine 3     • tamsulosin (FLOMAX) 0.4 MG capsule 1 capsule daily     • sertraline (ZOLOFT) 50 MG Tab Take 50 mg by mouth every day.     • Budeson-Glycopyrrol-Formoterol (BREZTRI AEROSPHERE) 160-9-4.8 MCG/ACT Aerosol Inhale 2 Puffs 2 times a day. 10.7 g 5   • aspirin 81 MG tablet Take 81 mg by mouth every day.     • simvastatin (ZOCOR) 80 MG tablet Take 80 mg by mouth every day.     • Multiple Vitamins-Minerals (OCUVITE PO) Take 1 Cap by mouth every day.     • Multiple Vitamins-Minerals (CENTRUM SILVER ADULT 50+ PO) Take 1 Tab by mouth every day.     • Omega-3 Fatty Acids (FISH OIL PO) Take 1 Cap by mouth every day.     • Coenzyme Q10 (COQ-10) 100 MG Cap Take 1 Cap by mouth every day.     • Ascorbic Acid (VITAMIN C PO) Take 500 mg by mouth every day.     • lisinopril (PRINIVIL) 5 MG Tab Take 5 mg by mouth every day.     • Cobalamine Combinations (VITAMIN B12-FOLIC ACID PO) Take 1 Tab by mouth every day.     • CEPHALEXIN 500 MG PO CAPS Take 1,000 mg by mouth every day. For an infection in the leg for 7 years.       No current facility-administered medications for this visit.         Physical Exam:   Gen:           Alert and oriented, No apparent distress. Mood and affect appropriate, normal interaction with examiner.  Eyes:          PERRL, EOM intact, sclere white, conjunctive moist.  Ears:          Not examined.   Hearing:     Grossly intact.  Nose:          Normal, no lesions or deformities.  Dentition:    Mask.  Oropharynx:   Mask.  Mallampati Classification: mask  Neck:        Supple, trachea midline, no masses.  Respiratory Effort: No intercostal retractions or use of accessory muscles.   Lung Auscultation:      Clear to auscultation bilaterally; no rales, rhonchi or wheezing.  CV:            Regular rate and rhythm. No murmurs, rubs or gallops.  Abd:           Not examined.   Lymphadenopathy: Not examined.  Gait and Station: Normal.  Digits and Nails: No clubbing, cyanosis, petechiae, or nodes.   Cranial  Nerves: II-XII grossly intact.  Skin:        No rashes, lesions or ulcers noted.               Ext:           No cyanosis or edema.      Assessment:  1. Chronic obstructive pulmonary disease, unspecified COPD type (HCC)  Multiple Oximetry      2. Centrilobular emphysema (HCC)  Multiple Oximetry      3. Hypoxia  Multiple Oximetry      4. Obstructive sleep apnea        5. Pulmonary nodules        6. BMI 35.0-35.9,adult  HEIGHT AND WEIGHT      7. Former smoker            Immunizations:    Flu:11/15/22  Pneumovax 23:2014  Prevnar 13:2015  PCV 20: not due  COVID-19: 12/9/21    Plan:  1.  Patient is having increased respiratory symptoms due to noncompliance of inhaler use and oxygen use on exertion.  We reviewed proper use in detail and the pathophysiology.  He is amenable to being more compliant.  Continue Breztri 2 puffs twice daily, Elvira as needed and prior to exercise more exertional activity.  2.  Sleep apnea is well controlled.  Continue auto CPAP with oxygen bleed in.  3.  Continue oxygen therapy on exertion as needed and nocturnally with CPAP.  He will continue to benefit from therapy.  4.  Right middle lobe pulmonary nodule stable, no further imaging indicated at this time.  5.  Encourage weight loss through regular activity and healthy eating.  Encourage regular exercise for conditioning.  6.  Follow-up in 3 months to check symptoms, sooner if needed.  Recommend repeating PFT in 6 months due to decline.    Please note that this dictation was created using voice recognition software. I have made every reasonable attempt to correct obvious errors, but it is possible there are errors of grammar and possibly content that I did not discover before finalizing the note.            Electronically signed by LOUIE GarciaRViniciusNVinicius  on 11/30/22    BEVERLY Crouch M.D.  [facility] [place] [address]  Via Fax: [phone]

## 2022-11-30 NOTE — PROGRESS NOTES
Chief Complaint   Patient presents with    Follow-Up     COPD / JEFF // last seen 8/22/2022     Results     PFT        HPI:  Danielito Tolliver is a 82 y.o. year old male here today for follow-up on moderate COPD with chronic respiratory failure and JEFF. Hx of pulmonary nodules that were stable.  Last OV 8/22/22    He is accompanied by his daughter today     MMRC rdGrdrrdarddrderd:rd rd3rd Exacerbations this year: 0, history of COVID-19 July 2022 and given Paxlovid and dexamethasone from PCP    PFT results today noted moderate COPD with decline noted from prior PFT March 2021.  FVC 3.5 L 105%, FEV1 1.59 L or 64%, FEV1/FVC ratio 45, %, TLC 7.05 L 112% and DLCO 65% predicted.  He did have a significant bronchodilator response.  Reviewed in detail with patient.  He admits to not using his bronchodilators consistently.  We reviewed proper use in detail.    Patient noted increased shortness of breath with cardiology who ordered CTA of chest 9/20/2022 that noted no PE, pulmonary emphysema and prior 7 to 8 mm nodule present along minor fissure.  This was not compared to prior imaging but appears to be the same nodule based on review of prior reports.  Patient declines any further imaging at this time.  He was started on diuretic therapy and using once daily.  Reviewed performing daily weights and reporting increasing greater than 3 pounds cardiology right away.    Echo 9/15/2022 noted mild concentric left ventricular hypertrophy, normal left ventricular systolic function with LVEF 60 to 65%.  Mild MR and RVSP 55 to 60 mmHg.  Reviewed with patient.  PET scan 3/11/2022 noted focal FDG accumulation in right prostate noting SUV 11.9.  Focal uptake in right ischium 6.6.  He is currently under the care of [facility] and receiving injection therapy.    He admits to not using his oxygen on exertion and does own his own portable battery POC.  He notes shortness of breath and having to stop 3-4 times when he goes outside around his mini  storage unit.  Once he sits down and sits he recovers quickly.  He continues to ride his stationary bike daily.  He notes cognitive changes and so does his daughter with decline in memory.  He has reviewed this with PCP but they have not pursued medication.    He continues to use auto CPAP 5 to 15 cm with 4 L oxygen bleed and nightly; ResMed device obtained 2021.  Compliance report 10/31/2022 through 11/29/2022 indicates 97% compliance, average nightly use 8 hours 36 minutes, mean pressure 11.2 cm, minimal to moderate mask leak with a reduced AHI of 0.7/h.  Reviewed with patient.  Goes to bed at 10 PM to 10:30 PM and falls asleep within 30 minutes.  He will generally wake between 1 and 3 AM to use the restroom and sometimes unable to resume sleep.  He will watch TV and then resume sleep.  He will wake by 6:30 AM.  He notes having a bad night less than 3 times per week.  He continues to tolerate mask and pressure well.  Denies morning headaches.    PULM & SLEEP HX:   Former smoker, quit 1998 with 25PYH.   PFT 5/15/2017 indicates FVC 4.74 L or 135% predicted, FEV1 2.53 L or 100% predicted, FEV1/FVC ratio 53, %, DLCO 90% predicted.   Spirometry 6/5/2019 indicates FEV1 2.06 L or 84%, FEV1/FVC ratio 47%, and mid flows at 37%.  Patient did a significant bronchodilator response.  [place] 11/6/2019 shows further decline with FEV1 1.87 L or 77%, FEV1/FVC ratio 51%.  Patient did have a significant bronchodilator response.  I reviewed finds with patient.  PFT 2/25/20 noted FEV1 2.16L or 84%, FEV1/FVC ratio 49, % and DLCO 84%.  PFT 3/11/21 notes FEV1 2.06L or 81%, FEV1/FVC ratio 48, %, and DLCO 78%. No significant bronchodilator response. Reviewed with patient.  Patient underwent heart cath 2017 and started having weight loss. His dyspnea significanly improved and he stopped inhalers.       He has a history of an abnormal CT scan with pulmonary nodules. The nodules were stable for 2 years; last CT was  "7/21/2009. CT scan at [facility] 2/23/16 indicates 8mm noncalcified pulmonary nodule in the right middle lobe and severe centrilobular emphysema. Addendum noted stability of nodule and no more imaging warranted. Results reviewed with patient.     HST 2013 indicated an overall AHI of 15 with a minimum oxygen saturation of 77%.  Patient currently uses RESMED auto CPAP 5-15cm H2O with 4 L oxygen bleed in; OBTAINED 2021. He will continue to benefit from night time O2 therapy.  He now has his old device in [place] for when he travels to see his wife.  CNOX 10/17/2015 indicated normal oximetry on that setting.    ROS: As per HPI and otherwise negative if not stated.    Past Medical History:   Diagnosis Date    Breath shortness     Uses 02 @ 4L per nc at home prn     CATARACT     IOL OU    Cold     coughing up \"dirty\" sputum    Dental disorder     Full Upper    Emphysema of lung (HCC)     Hypercholesteremia     Indigestion     Infection     left knee post TKA    JEFF (obstructive sleep apnea)     AUTO CPAP 5-15CM WITH O2 4 LPM    Pain     low back pain left side    Psychiatric problem     takes med for depression    Snoring     Uses CPAP       Past Surgical History:   Procedure Laterality Date    KNEE ARTHROTOMY Left 3/24/2016    Procedure: KNEE ARTHROTOMY-HARDWARE REMOVAL AND HETEROTOPIC OSSIFICATION;  Surgeon: Yasmani Bradley M.D.;  Location: SURGERY [facility];  Service:     OTHER ORTHOPEDIC SURGERY  3-21-16    \"Knee ReplacementSurgeries Left kneex3 Rightx2\"     OTHER CARDIAC SURGERY  2013    \"Open Heart-Coronary Artery Bypass, Heart Stent\"    BRONCHOSCOPY-ENDO  3/25/2009    Performed by SINCERE PABLO at SURGERY [facility]    CATARACT EXTRACTION WITH IOL      B/L    OTHER      TKA B/L       History reviewed. No pertinent family history.    Social History     Socioeconomic History    Marital status:      Spouse name: Not on file    Number of children: Not on file    Years of education: Not on file    Highest " "education level: Not on file   Occupational History    Not on file   Tobacco Use    Smoking status: Former     Packs/day: 1.00     Years: 25.00     Pack years: 25.00     Types: Cigarettes     Quit date: 1998     Years since quittin.9    Smokeless tobacco: Never   Substance and Sexual Activity    Alcohol use: No     Alcohol/week: 0.0 oz    Drug use: No    Sexual activity: Not on file   Other Topics Concern    Not on file   Social History Narrative    Not on file     Social Determinants of Health     Financial Resource Strain: Not on file   Food Insecurity: Not on file   Transportation Needs: Not on file   Physical Activity: Not on file   Stress: Not on file   Social Connections: Not on file   Intimate Partner Violence: Not on file   Housing Stability: Not on file       Allergies as of 2022 - Reviewed 2022   Allergen Reaction Noted    Morphine Anxiety 2016        Vitals:  /60 (BP Location: Left arm, Patient Position: Sitting, BP Cuff Size: Adult)   Pulse 87   Resp 16   Ht 1.676 m (5' 6\")   Wt 101 kg (222 lb)   SpO2 95%     Current medications as of today   Current Outpatient Medications   Medication Sig Dispense Refill    nitroglycerin (NITROSTAT) 0.4 MG SL Tab 1 tablet under the tongue and allow to dissolve as needed Sublingual every 5 min as needed for severe chest pain      albuterol 108 (90 Base) MCG/ACT Aero Soln inhalation aerosol INHALE 2 PUFFS BY MOUTH EVERY 6 HOURS AS NEEDED FOR SHORTNESS OF BREATH 3 Each 3    prasugrel (EFFIENT) 10 MG Tab Take 10 mg by mouth every day.      Respiratory Therapy Supplies (CARETOUCH 2 CPAP HOSE ) Misc CPAP Machine 3      tamsulosin (FLOMAX) 0.4 MG capsule 1 capsule daily      sertraline (ZOLOFT) 50 MG Tab Take 50 mg by mouth every day.      Budeson-Glycopyrrol-Formoterol (BREZTRI AEROSPHERE) 160-9-4.8 MCG/ACT Aerosol Inhale 2 Puffs 2 times a day. 10.7 g 5    aspirin 81 MG tablet Take 81 mg by mouth every day.      simvastatin (ZOCOR) " 80 MG tablet Take 80 mg by mouth every day.      Multiple Vitamins-Minerals (OCUVITE PO) Take 1 Cap by mouth every day.      Multiple Vitamins-Minerals (CENTRUM SILVER ADULT 50+ PO) Take 1 Tab by mouth every day.      Omega-3 Fatty Acids (FISH OIL PO) Take 1 Cap by mouth every day.      Coenzyme Q10 (COQ-10) 100 MG Cap Take 1 Cap by mouth every day.      Ascorbic Acid (VITAMIN C PO) Take 500 mg by mouth every day.      lisinopril (PRINIVIL) 5 MG Tab Take 5 mg by mouth every day.      Cobalamine Combinations (VITAMIN B12-FOLIC ACID PO) Take 1 Tab by mouth every day.      CEPHALEXIN 500 MG PO CAPS Take 1,000 mg by mouth every day. For an infection in the leg for 7 years.       No current facility-administered medications for this visit.         Physical Exam:   Gen:           Alert and oriented, No apparent distress. Mood and affect appropriate, normal interaction with examiner.  Eyes:          PERRL, EOM intact, sclere white, conjunctive moist.  Ears:          Not examined.   Hearing:     Grossly intact.  Nose:          Normal, no lesions or deformities.  Dentition:    Mask.  Oropharynx:   Mask.  Mallampati Classification: mask  Neck:        Supple, trachea midline, no masses.  Respiratory Effort: No intercostal retractions or use of accessory muscles.   Lung Auscultation:      Clear to auscultation bilaterally; no rales, rhonchi or wheezing.  CV:            Regular rate and rhythm. No murmurs, rubs or gallops.  Abd:           Not examined.   Lymphadenopathy: Not examined.  Gait and Station: Normal.  Digits and Nails: No clubbing, cyanosis, petechiae, or nodes.   Cranial Nerves: II-XII grossly intact.  Skin:        No rashes, lesions or ulcers noted.               Ext:           No cyanosis or edema.      Assessment:  1. Chronic obstructive pulmonary disease, unspecified COPD type (HCC)  Multiple Oximetry      2. Centrilobular emphysema (HCC)  Multiple Oximetry      3. Hypoxia  Multiple Oximetry      4. Obstructive  sleep apnea        5. Pulmonary nodules        6. BMI 35.0-35.9,adult  HEIGHT AND WEIGHT      7. Former smoker            Immunizations:    Flu:11/15/22  Pneumovax 23:2014  Prevnar 13:2015  PCV 20: not due  COVID-19: 12/9/21    Plan:  1.  Patient is having increased respiratory symptoms due to noncompliance of inhaler use and oxygen use on exertion.  We reviewed proper use in detail and the pathophysiology.  He is amenable to being more compliant.  Continue Breztri 2 puffs twice daily, Elvira as needed and prior to exercise more exertional activity.  2.  Sleep apnea is well controlled.  Continue auto CPAP with oxygen bleed in.  3.  Continue oxygen therapy on exertion as needed and nocturnally with CPAP.  He will continue to benefit from therapy.  4.  Right middle lobe pulmonary nodule stable, no further imaging indicated at this time.  5.  Encourage weight loss through regular activity and healthy eating.  Encourage regular exercise for conditioning.  6.  Follow-up in 3 months to check symptoms, sooner if needed.  Recommend repeating PFT in 6 months due to decline.    Please note that this dictation was created using voice recognition software. I have made every reasonable attempt to correct obvious errors, but it is possible there are errors of grammar and possibly content that I did not discover before finalizing the note.

## 2022-11-30 NOTE — PROCEDURES
Multi-Ox Readings  Multi Ox #1 Room air   O2 sat % at rest 96   O2 sat % on exertion 92   O2 sat average on exertion     Multi Ox #2     O2 sat % at rest     O2 sat % on exertion     O2 sat average on exertion       Oxygen Use 4   Oxygen Frequency Nocturnal in conjunction with CPAP   Duration of need     Is the patient mobile within the home?     CPAP Use? auto CPAP 5 to 15 cm with 4 L oxygen bleed   BIPAP Use?     Servo Titration          Performed 5xSTS:  27 seconds; 2MWT:  160 feet

## 2022-11-30 NOTE — PATIENT INSTRUCTIONS
Continue daily weights, weighing first thing in the morning and record; if you gain >3lbs over night let the cardiologist know right away    Start using Breztri 2 puffs twice daily.  Use albuterol 2 puffs prior to going outside to [employer] storage and put oxygen on whenever you are exercising and walking around    Continue CPAP with 4LPM at night

## 2023-01-09 RX ORDER — BUDESONIDE, GLYCOPYRROLATE, AND FORMOTEROL FUMARATE 160; 9; 4.8 UG/1; UG/1; UG/1
AEROSOL, METERED RESPIRATORY (INHALATION)
Qty: 10.7 G | Refills: 5 | Status: SHIPPED | OUTPATIENT
Start: 2023-01-09 | End: 2024-01-01

## 2023-01-09 NOTE — TELEPHONE ENCOUNTER
Have we ever prescribed this med? Yes.  If yes, what date? 8/24/2021    Last OV: 11/30/2022 LEILANI SOMMER     Next OV: 3/6/2023 LEILANI SOMMER     DX: COPD     Medications: BREZTRI AEROSPHERE 160-9-4.8 MCG/ACT Aerosol

## 2023-03-06 ENCOUNTER — APPOINTMENT (OUTPATIENT)
Dept: SLEEP MEDICINE | Facility: MEDICAL CENTER | Age: 83
End: 2023-03-06
Attending: NURSE PRACTITIONER
Payer: COMMERCIAL

## 2023-03-06 DIAGNOSIS — J44.9 CHRONIC OBSTRUCTIVE PULMONARY DISEASE, UNSPECIFIED COPD TYPE (HCC): ICD-10-CM

## 2023-03-14 ENCOUNTER — OFFICE VISIT (OUTPATIENT)
Dept: SLEEP MEDICINE | Facility: MEDICAL CENTER | Age: 83
End: 2023-03-14
Attending: NURSE PRACTITIONER
Payer: COMMERCIAL

## 2023-03-14 VITALS
SYSTOLIC BLOOD PRESSURE: 128 MMHG | OXYGEN SATURATION: 92 % | WEIGHT: 220 LBS | DIASTOLIC BLOOD PRESSURE: 60 MMHG | RESPIRATION RATE: 16 BRPM | HEIGHT: 66 IN | BODY MASS INDEX: 35.36 KG/M2 | HEART RATE: 70 BPM

## 2023-03-14 DIAGNOSIS — R91.8 PULMONARY NODULES: Chronic | ICD-10-CM

## 2023-03-14 DIAGNOSIS — J96.11 CHRONIC RESPIRATORY FAILURE WITH HYPOXIA (HCC): ICD-10-CM

## 2023-03-14 DIAGNOSIS — G47.33 OBSTRUCTIVE SLEEP APNEA: Chronic | ICD-10-CM

## 2023-03-14 DIAGNOSIS — Z87.891 FORMER SMOKER: ICD-10-CM

## 2023-03-14 DIAGNOSIS — J44.9 CHRONIC OBSTRUCTIVE PULMONARY DISEASE, UNSPECIFIED COPD TYPE (HCC): ICD-10-CM

## 2023-03-14 DIAGNOSIS — J43.2 CENTRILOBULAR EMPHYSEMA (HCC): Chronic | ICD-10-CM

## 2023-03-14 PROCEDURE — 99213 OFFICE O/P EST LOW 20 MIN: CPT | Performed by: NURSE PRACTITIONER

## 2023-03-14 PROCEDURE — 99214 OFFICE O/P EST MOD 30 MIN: CPT | Performed by: NURSE PRACTITIONER

## 2023-03-14 ASSESSMENT — FIBROSIS 4 INDEX: FIB4 SCORE: 1.16

## 2023-03-14 ASSESSMENT — PATIENT HEALTH QUESTIONNAIRE - PHQ9: CLINICAL INTERPRETATION OF PHQ2 SCORE: 0

## 2023-03-14 NOTE — PATIENT INSTRUCTIONS
Overnight oximetry using PAP with 4LPM O2 now; call Edith Bettencourt to schedule test and once complete send [employer] message for results    Use oxygen whenever when moving    Continue Ignacio in AM and PM    Use albuterol prior to working/long distance walking

## 2023-03-14 NOTE — LETTER
ERLIN Garcia  [facility] - Operated by [facility]   [address], [address] [address]  Phone: [phone] - Fax: [phone]           Encounter Date: 3/14/2023  Provider: ERLIN Garcia  Location of Care: [facility] - OPERATED BY [facility]  [address] [place] [address] [phone]      Patient:   Danielito Tolliver   MR Number: [id number]   YOB: 1940     PROGRESS NOTE:  Chief Complaint   Patient presents with   • Follow-Up     COPD / JEFF // last seen 11/30/2022        HPI:  Danielito Tolliver is a 82 y.o. year old male here today for follow-up on moderate COPD with chronic respiratory failure on exertion and JEFF.  History of stable pulmonary nodules not requiring further imaging at this time.  Last office visit 11/30/2022    Accompanied by his daughter who helps with his care    MMRC rdGrdrrdarddrderd:rd rd3rd Exacerbations this year: 0    He presents today with multiple questions about proper oxygen use.  He admits to not always using on exertion to get out of breath quickly when in his shop after 10 minutes and having to sit down to recover.  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  He holds onto a grocery cart and will shop for 1 hour when in the grocery.  He notes chronic a.m. cough and denies chest pain, chest tightness or wheezing.  No recent exacerbations.  Shortness of breath remains with exertion.    He continues to use auto CPAP 5 to 15 cm with 4 L oxygen bleed and nightly; ResMed device obtained 2021.  Compliance report 2/12/2023 through 3/13/2023 indicates 100% compliance, average nightly use 8 hours 17 minutes, mean pressure 12.8 cm, minimal mask leak with an overall AHI of 0.7/h.  Reviewed with patient.  Overall he sleeps well and has no complaints.  Recommend updating overnight oximetry to  verify oxygen saturations remained stable since last 1 was performed in 2015.  He will perform the DME and contact me for results.  He would also like to try a nasal mask to see if he can tolerate that better than his current fullface mask.    PULM & SLEEP HX:   Former smoker, quit 1998 with 25PYH.   PFT 5/15/2017 indicates FVC 4.74 L or 135% predicted, FEV1 2.53 L or 100% predicted, FEV1/FVC ratio 53, %, DLCO 90% predicted.   Spirometry 6/5/2019 indicates FEV1 2.06 L or 84%, FEV1/FVC ratio 47%, and mid flows at 37%.  Patient did a significant bronchodilator response.  Cipriano 11/6/2019 shows further decline with FEV1 1.87 L or 77%, FEV1/FVC ratio 51%.  Patient did have a significant bronchodilator response.  I reviewed finds with patient.  PFT 2/25/20 noted FEV1 2.16L or 84%, FEV1/FVC ratio 49, % and DLCO 84%.  PFT 3/11/21 notes FEV1 2.06L or 81%, FEV1/FVC ratio 48, %, and DLCO 78%. No significant bronchodilator response.   PFT results today noted moderate COPD with decline noted from prior PFT March 2021.  FVC 3.5 L 105%, FEV1 1.59 L or 64%, FEV1/FVC ratio 45, %, TLC 7.05 L 112% and DLCO 65% predicted.  He did have a significant bronchodilator response.      Patient underwent heart cath 2017 and started having weight loss. His dyspnea significanly improved and he stopped inhalers.   Echo 9/15/2022 noted mild concentric left ventricular hypertrophy, normal left ventricular systolic function with LVEF 60 to 65%.  Mild MR and RVSP 55 to 60 mmHg.  Reviewed with patient.  PET scan 3/11/2022 noted focal FDG accumulation in right prostate noting SUV 11.9.  Focal uptake in right ischium 6.6.  He is currently under the care of [facility] and receiving injection therapy.      He has a history of an abnormal CT scan with pulmonary nodules. The nodules were stable for 2 years; last CT was 7/21/2009. CT scan at [facility] 2/23/16 indicates 8mm noncalcified pulmonary nodule in the right middle lobe  "and severe centrilobular emphysema. Addendum noted stability of nodule and no more imaging warranted.   CTA of chest 9/20/2022 that noted no PE, pulmonary emphysema and prior 7 to 8 mm nodule present along minor fissure.  This was not compared to prior imaging but appears to be the same nodule based on review of prior reports.  Patient declines any further imaging at this time     HST 2013 indicated an overall AHI of 15 with a minimum oxygen saturation of 77%.  Patient currently uses RESMED auto CPAP 5-15cm H2O with 4 L oxygen bleed in; OBTAINED 2021. He will continue to benefit from night time O2 therapy.  He now has his old device in [place] for when he travels to see his wife.  CNOX 10/17/2015 indicated normal oximetry on that setting.    ROS: As per HPI and otherwise negative if not stated.    Past Medical History:   Diagnosis Date   • Breath shortness     Uses 02 @ 4L per nc at home prn    • CATARACT     IOL OU   • Cold     coughing up \"dirty\" sputum   • Dental disorder     Full Upper   • Emphysema of lung (HCC)    • Hypercholesteremia    • Indigestion    • Infection     left knee post TKA   • JEFF (obstructive sleep apnea)     AUTO CPAP 5-15CM WITH O2 4 LPM   • Pain     low back pain left side   • Psychiatric problem     takes med for depression   • Snoring     Uses CPAP       Past Surgical History:   Procedure Laterality Date   • KNEE ARTHROTOMY Left 3/24/2016    Procedure: KNEE ARTHROTOMY-HARDWARE REMOVAL AND HETEROTOPIC OSSIFICATION;  Surgeon: Yasmani Bradley M.D.;  Location: SURGERY [facility];  Service:    • OTHER ORTHOPEDIC SURGERY  3-21-16    \"Knee ReplacementSurgeries Left kneex3 Rightx2\"    • OTHER CARDIAC SURGERY  2013    \"Open Heart-Coronary Artery Bypass, Heart Stent\"   • BRONCHOSCOPY-ENDO  3/25/2009    Performed by SINCERE PABLO at [facility]   • CATARACT EXTRACTION WITH IOL      B/L   • OTHER      TKA B/L       History reviewed. No pertinent family history.    Social History " "    Socioeconomic History   • Marital status:      Spouse name: Not on file   • Number of children: Not on file   • Years of education: Not on file   • Highest education level: Not on file   Occupational History   • Not on file   Tobacco Use   • Smoking status: Former     Packs/day: 1.00     Years: 25.00     Pack years: 25.00     Types: Cigarettes     Quit date: 1998     Years since quittin.2   • Smokeless tobacco: Never   Substance and Sexual Activity   • Alcohol use: No     Alcohol/week: 0.0 oz   • Drug use: No   • Sexual activity: Not on file   Other Topics Concern   • Not on file   Social History Narrative   • Not on file     Social Determinants of Health     Financial Resource Strain: Not on file   Food Insecurity: Not on file   Transportation Needs: Not on file   Physical Activity: Not on file   Stress: Not on file   Social Connections: Not on file   Intimate Partner Violence: Not on file   Housing Stability: Not on file       Allergies as of 2023 - Reviewed 2023   Allergen Reaction Noted   • Morphine Anxiety 2016        Vitals:  /60 (BP Location: Left arm, Patient Position: Sitting, BP Cuff Size: Adult)   Pulse 70   Resp 16   Ht 1.676 m (5' 6\")   Wt 99.8 kg (220 lb)   SpO2 92%     Current medications as of today   Current Outpatient Medications   Medication Sig Dispense Refill   • BREZTRI AEROSPHERE 160-9-4.8 MCG/ACT Aerosol INHALE 2 PUFFS BY MOUTH TWICE DAILY 10.7 g 5   • furosemide (LASIX) 20 MG Tab      • nitroglycerin (NITROSTAT) 0.4 MG SL Tab 1 tablet under the tongue and allow to dissolve as needed Sublingual every 5 min as needed for severe chest pain     • prasugrel (EFFIENT) 10 MG Tab Take 10 mg by mouth every day.     • Respiratory Therapy Supplies (CARETOUCH 2 CPAP HOSE ) Misc CPAP Machine 3     • tamsulosin (FLOMAX) 0.4 MG capsule 1 capsule daily     • sertraline (ZOLOFT) 50 MG Tab Take 50 mg by mouth every day.     • albuterol 108 (90 Base) " MCG/ACT Aero Soln inhalation aerosol INHALE 2 PUFFS BY MOUTH EVERY 6 HOURS AS NEEDED FOR SHORTNESS OF BREATH 3 Each 3   • aspirin 81 MG tablet Take 81 mg by mouth every day.     • simvastatin (ZOCOR) 80 MG tablet Take 80 mg by mouth every day.     • Multiple Vitamins-Minerals (OCUVITE PO) Take 1 Cap by mouth every day.     • Multiple Vitamins-Minerals (CENTRUM SILVER ADULT 50+ PO) Take 1 Tab by mouth every day.     • Omega-3 Fatty Acids (FISH OIL PO) Take 1 Cap by mouth every day.     • Coenzyme Q10 (COQ-10) 100 MG Cap Take 1 Cap by mouth every day.     • Ascorbic Acid (VITAMIN C PO) Take 500 mg by mouth every day.     • lisinopril (PRINIVIL) 5 MG Tab Take 5 mg by mouth every day.     • Cobalamine Combinations (VITAMIN B12-FOLIC ACID PO) Take 1 Tab by mouth every day.     • CEPHALEXIN 500 MG PO CAPS Take 1,000 mg by mouth every day. For an infection in the leg for 7 years.       No current facility-administered medications for this visit.         Physical Exam:   Gen:           Alert and oriented, No apparent distress. Mood and affect appropriate, normal interaction with examiner.  Eyes:          PERRL, EOM intact, sclere white, conjunctive moist.  Ears:          Not examined.   Hearing:     Grossly intact.  Nose:          Normal, no lesions or deformities.  Dentition:    Mask.  Oropharynx:   Mask.  Mallampati Classification: mask  Neck:        Supple, trachea midline, no masses.  Respiratory Effort: No intercostal retractions or use of accessory muscles.   Lung Auscultation:      Clear to auscultation bilaterally; no rales, rhonchi or wheezing.  CV:            Regular rate and rhythm. No murmurs, rubs or gallops.  Abd:           Not examined.   Lymphadenopathy: Not examined.  Gait and Station: Normal.  Digits and Nails: No clubbing, cyanosis, petechiae, or nodes.   Cranial Nerves: II-XII grossly intact.  Skin:        No rashes, lesions or ulcers noted.               Ext:           No cyanosis or  edema.      Assessment:  1. Centrilobular emphysema (HCC)  Overnight Oximetry      2. Obstructive sleep apnea  DME Mask and Supplies    Overnight Oximetry      3. Pulmonary nodules        4. Chronic obstructive pulmonary disease, unspecified COPD type (HCC)  Overnight Oximetry      5. Chronic respiratory failure with hypoxia (HCC)        6. BMI 35.0-35.9,adult  HEIGHT AND WEIGHT      7. Former smoker            Immunizations:    Flu:11/16/22  Pneumovax 23:2014  Prevnar 13:2015  PCV 20: not due  COVID-19: 12/9/21    Plan:  COPD is stable but we reviewed the pathophysiology of COPD and the necessary treatments to slow down progression of the disease process and improve quality of life.  He will continue bronchodilators and be compliant with use.  We also discussed proper oxygen use.  Reviewed the need for exercise and deep breathing techniques.  He will continue Breztri 2 puffs twice daily, RESHMA prior to more exertional activity and oxygen with any type of exertion.   Recommend 2LPM O2 with exertion and 4LPM O2 at night  JEFF is well controlled.  Continue auto CPAP 5 to 15 cm with 4 L oxygen bleed in.  Recommend updating overnight oximetry to verify hypoxia is well treated.   DME CNOX on pap/o2 now; advised daughter to send [employer] message for results. Pendign results will make adjustments in therapy.   DME mask/supplies; order for 1 nasal mask to try otherwise full face mask  No further follow-up for pulmonary nodule needed since it has been stable after review of several images and reports.  Encourage weight loss or healthy eating regular activity  Follow-up primary care further health concerns  Follow-up in 6 months with updated PFT, sooner if needed.    Please note that this dictation was created using voice recognition software. I have made every reasonable attempt to correct obvious errors, but it is possible there are errors of grammar and possibly content that I did not discover before finalizing the  note.          Electronically signed by ERLIN Garcia  on 03/14/23    Jemal Crouch M.D.  [facility] [place] [address]  Via Fax: [phone]

## 2023-03-14 NOTE — PROGRESS NOTES
Chief Complaint   Patient presents with    Follow-Up     COPD / JEFF // last seen 11/30/2022        HPI:  Danielito Tolliver is a 82 y.o. year old male here today for follow-up on moderate COPD with chronic respiratory failure on exertion and JEFF.  History of stable pulmonary nodules not requiring further imaging at this time.  Last office visit 11/30/2022    Accompanied by his daughter who helps with his care    MMRC stGstrstastdstest:st st1st Exacerbations this year: 0    He presents today with multiple questions about proper oxygen use.  He admits to not always using on exertion to get out of breath quickly when in his shop after 10 minutes and having to sit down to recover.  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  He holds onto a grocery cart and will shop for 1 hour when in the grocery.  He notes chronic a.m. cough and denies chest pain, chest tightness or wheezing.  No recent exacerbations.  Shortness of breath remains with exertion.  BMI 35    He continues to use auto CPAP 5 to 15 cm with 4 L oxygen bleed and nightly; ResMed device obtained 2021.  Compliance report 2/12/2023 through 3/13/2023 indicates 100% compliance, average nightly use 8 hours 17 minutes, mean pressure 12.8 cm, minimal mask leak with an overall AHI of 0.7/h.  Reviewed with patient.  Overall he sleeps well and has no complaints.  Recommend updating overnight oximetry to verify oxygen saturations remained stable since last 1 was performed in 2015.  He will perform the DME and contact me for results.  He would also like to try a nasal mask to see if he can tolerate that better than his current fullface mask.    PULM & SLEEP HX:   Former smoker, quit 1998 with 25PYH.   PFT 5/15/2017 indicates FVC 4.74 L or 135% predicted, FEV1 2.53 L or 100% predicted, FEV1/FVC ratio 53, %, DLCO 90% predicted.   Spirometry 6/5/2019 indicates FEV1 2.06 L or 84%, FEV1/FVC ratio 47%, and mid flows at 37%.  Patient did a significant  bronchodilator response.  Cipriano 11/6/2019 shows further decline with FEV1 1.87 L or 77%, FEV1/FVC ratio 51%.  Patient did have a significant bronchodilator response.  I reviewed finds with patient.  PFT 2/25/20 noted FEV1 2.16L or 84%, FEV1/FVC ratio 49, % and DLCO 84%.  PFT 3/11/21 notes FEV1 2.06L or 81%, FEV1/FVC ratio 48, %, and DLCO 78%. No significant bronchodilator response.   PFT results today noted moderate COPD with decline noted from prior PFT March 2021.  FVC 3.5 L 105%, FEV1 1.59 L or 64%, FEV1/FVC ratio 45, %, TLC 7.05 L 112% and DLCO 65% predicted.  He did have a significant bronchodilator response.      Patient underwent heart cath 2017 and started having weight loss. His dyspnea significanly improved and he stopped inhalers.   Echo 9/15/2022 noted mild concentric left ventricular hypertrophy, normal left ventricular systolic function with LVEF 60 to 65%.  Mild MR and RVSP 55 to 60 mmHg.  Reviewed with patient.  PET scan 3/11/2022 noted focal FDG accumulation in right prostate noting SUV 11.9.  Focal uptake in right ischium 6.6.  He is currently under the care of [facility] and receiving injection therapy.      He has a history of an abnormal CT scan with pulmonary nodules. The nodules were stable for 2 years; last CT was 7/21/2009. CT scan at [facility] 2/23[facility]16 indicates 8mm noncalcified pulmonary nodule in the right middle lobe and severe centrilobular emphysema. Addendum noted stability of nodule and no more imaging warranted.   CTA of chest 9/20/2022 that noted no PE, pulmonary emphysema and prior 7 to 8 mm nodule present along minor fissure.  This was not compared to prior imaging but appears to be the same nodule based on review of prior reports.  Patient declines any further imaging at this time     HST 2013 indicated an overall AHI of 15 with a minimum oxygen saturation of 77%.  Patient currently uses RESMED auto CPAP 5-15cm H2O with 4 L oxygen bleed in; OBTAINED 2021.  "He will continue to benefit from night time O2 therapy.  He now has his old device in [place] for when he travels to see his wife.  CNOX 10/17/2015 indicated normal oximetry on that setting.    ROS: As per HPI and otherwise negative if not stated.    Past Medical History:   Diagnosis Date    Breath shortness     Uses 02 @ 4L per nc at home prn     CATARACT     IOL OU    Cold     coughing up \"dirty\" sputum    Dental disorder     Full Upper    Emphysema of lung (HCC)     Hypercholesteremia     Indigestion     Infection     left knee post TKA    JEFF (obstructive sleep apnea)     AUTO CPAP 5-15CM WITH O2 4 LPM    Pain     low back pain left side    Psychiatric problem     takes med for depression    Snoring     Uses CPAP       Past Surgical History:   Procedure Laterality Date    KNEE ARTHROTOMY Left 3/24/2016    Procedure: KNEE ARTHROTOMY-HARDWARE REMOVAL AND HETEROTOPIC OSSIFICATION;  Surgeon: Yasmani Bradley M.D.;  Location: SURGERY [facility];  Service:     OTHER ORTHOPEDIC SURGERY  3-21-16    \"Knee ReplacementSurgeries Left kneex3 Rightx2\"     OTHER CARDIAC SURGERY      \"Open Heart-Coronary Artery Bypass, Heart Stent\"    BRONCHOSCOPY-ENDO  3/25/2009    Performed by SINCERE PABLO at SURGERY [facility]    CATARACT EXTRACTION WITH IOL      B/L    OTHER      TKA B/L       History reviewed. No pertinent family history.    Social History     Socioeconomic History    Marital status:      Spouse name: Not on file    Number of children: Not on file    Years of education: Not on file    Highest education level: Not on file   Occupational History    Not on file   Tobacco Use    Smoking status: Former     Packs/day: 1.00     Years: 25.00     Pack years: 25.00     Types: Cigarettes     Quit date: 1998     Years since quittin.2    Smokeless tobacco: Never   Substance and Sexual Activity    Alcohol use: No     Alcohol/week: 0.0 oz    Drug use: No    Sexual activity: Not on file   Other Topics " "Concern    Not on file   Social History Narrative    Not on file     Social Determinants of Health     Financial Resource Strain: Not on file   Food Insecurity: Not on file   Transportation Needs: Not on file   Physical Activity: Not on file   Stress: Not on file   Social Connections: Not on file   Intimate Partner Violence: Not on file   Housing Stability: Not on file       Allergies as of 03/14/2023 - Reviewed 03/14/2023   Allergen Reaction Noted    Morphine Anxiety 03/21/2016        Vitals:  /60 (BP Location: Left arm, Patient Position: Sitting, BP Cuff Size: Adult)   Pulse 70   Resp 16   Ht 1.676 m (5' 6\")   Wt 99.8 kg (220 lb)   SpO2 92%     Current medications as of today   Current Outpatient Medications   Medication Sig Dispense Refill    BREZTRI AEROSPHERE 160-9-4.8 MCG/ACT Aerosol INHALE 2 PUFFS BY MOUTH TWICE DAILY 10.7 g 5    furosemide (LASIX) 20 MG Tab       nitroglycerin (NITROSTAT) 0.4 MG SL Tab 1 tablet under the tongue and allow to dissolve as needed Sublingual every 5 min as needed for severe chest pain      prasugrel (EFFIENT) 10 MG Tab Take 10 mg by mouth every day.      Respiratory Therapy Supplies (CARETOUCH 2 CPAP HOSE ) Misc CPAP Machine 3      tamsulosin (FLOMAX) 0.4 MG capsule 1 capsule daily      sertraline (ZOLOFT) 50 MG Tab Take 50 mg by mouth every day.      albuterol 108 (90 Base) MCG/ACT Aero Soln inhalation aerosol INHALE 2 PUFFS BY MOUTH EVERY 6 HOURS AS NEEDED FOR SHORTNESS OF BREATH 3 Each 3    aspirin 81 MG tablet Take 81 mg by mouth every day.      simvastatin (ZOCOR) 80 MG tablet Take 80 mg by mouth every day.      Multiple Vitamins-Minerals (OCUVITE PO) Take 1 Cap by mouth every day.      Multiple Vitamins-Minerals (CENTRUM SILVER ADULT 50+ PO) Take 1 Tab by mouth every day.      Omega-3 Fatty Acids (FISH OIL PO) Take 1 Cap by mouth every day.      Coenzyme Q10 (COQ-10) 100 MG Cap Take 1 Cap by mouth every day.      Ascorbic Acid (VITAMIN C PO) Take 500 mg by " mouth every day.      lisinopril (PRINIVIL) 5 MG Tab Take 5 mg by mouth every day.      Cobalamine Combinations (VITAMIN B12-FOLIC ACID PO) Take 1 Tab by mouth every day.      CEPHALEXIN 500 MG PO CAPS Take 1,000 mg by mouth every day. For an infection in the leg for 7 years.       No current facility-administered medications for this visit.         Physical Exam:   Gen:           Alert and oriented, No apparent distress. Mood and affect appropriate, normal interaction with examiner.  Eyes:          PERRL, EOM intact, sclere white, conjunctive moist.  Ears:          Not examined.   Hearing:     Grossly intact.  Nose:          Normal, no lesions or deformities.  Dentition:    Mask.  Oropharynx:   Mask.  Mallampati Classification: mask  Neck:        Supple, trachea midline, no masses.  Respiratory Effort: No intercostal retractions or use of accessory muscles.   Lung Auscultation:      Clear to auscultation bilaterally; no rales, rhonchi or wheezing.  CV:            Regular rate and rhythm. No murmurs, rubs or gallops.  Abd:           Not examined.   Lymphadenopathy: Not examined.  Gait and Station: Normal.  Digits and Nails: No clubbing, cyanosis, petechiae, or nodes.   Cranial Nerves: II-XII grossly intact.  Skin:        No rashes, lesions or ulcers noted.               Ext:           No cyanosis or edema.      Assessment:  1. Centrilobular emphysema (HCC)  Overnight Oximetry      2. Obstructive sleep apnea  DME Mask and Supplies    Overnight Oximetry      3. Pulmonary nodules        4. Chronic obstructive pulmonary disease, unspecified COPD type (HCC)  Overnight Oximetry      5. Chronic respiratory failure with hypoxia (HCC)        6. BMI 35.0-35.9,adult  HEIGHT AND WEIGHT      7. Former smoker            Immunizations:    Flu:11/16/22  Pneumovax 23:2014  Prevnar 13:2015  PCV 20: not due  COVID-19: 12/9/21    Plan:  COPD is stable but we reviewed the pathophysiology of COPD and the necessary treatments to slow down  progression of the disease process and improve quality of life.  He will continue bronchodilators and be compliant with use.  We also discussed proper oxygen use.  Reviewed the need for exercise and deep breathing techniques.  He will continue Breztri 2 puffs twice daily, RESHMA prior to more exertional activity and oxygen with any type of exertion.   Recommend 2LPM O2 with exertion and 4LPM O2 at night  JEFF is well controlled.  Continue auto CPAP 5 to 15 cm with 4 L oxygen bleed in.  Recommend updating overnight oximetry to verify hypoxia is well treated.   DME CNOX on pap/o2 now; advised daughter to send [employer] message for results. Pendign results will make adjustments in therapy.   DME mask/supplies; order for 1 nasal mask to try otherwise full face mask  No further follow-up for pulmonary nodule needed since it has been stable after review of several images and reports.  Encourage weight loss or healthy eating regular activity  Follow-up primary care further health concerns  Follow-up in 6 months with updated PFT, sooner if needed.    ADDENDUM: Overnight oximetry 3/29/2023 using CPAP with 4 L oxygen bleed and indicates basal SPO2 of 95.5%, less than 88% for 1 minute.  Recommend continuing current settings.  Patient did have 1 hour of missing signal on device but adequate oxygen saturations.  [email or web address] message sent to patient with results.    Please note that this dictation was created using voice recognition software. I have made every reasonable attempt to correct obvious errors, but it is possible there are errors of grammar and possibly content that I did not discover before finalizing the note.

## 2023-09-14 ENCOUNTER — NON-PROVIDER VISIT (OUTPATIENT)
Dept: SLEEP MEDICINE | Facility: MEDICAL CENTER | Age: 83
End: 2023-09-14
Attending: NURSE PRACTITIONER
Payer: MEDICARE

## 2023-09-14 VITALS
RESPIRATION RATE: 16 BRPM | OXYGEN SATURATION: 94 % | BODY MASS INDEX: 35.2 KG/M2 | HEIGHT: 66 IN | SYSTOLIC BLOOD PRESSURE: 130 MMHG | WEIGHT: 219 LBS | HEART RATE: 62 BPM | DIASTOLIC BLOOD PRESSURE: 70 MMHG

## 2023-09-14 VITALS — BODY MASS INDEX: 35.2 KG/M2 | HEIGHT: 66 IN | WEIGHT: 219 LBS

## 2023-09-14 DIAGNOSIS — R91.8 PULMONARY NODULES: Chronic | ICD-10-CM

## 2023-09-14 DIAGNOSIS — J44.9 CHRONIC OBSTRUCTIVE PULMONARY DISEASE, UNSPECIFIED COPD TYPE (HCC): Chronic | ICD-10-CM

## 2023-09-14 DIAGNOSIS — Z23 NEED FOR VACCINATION: ICD-10-CM

## 2023-09-14 DIAGNOSIS — J44.9 CHRONIC OBSTRUCTIVE PULMONARY DISEASE, UNSPECIFIED COPD TYPE (HCC): ICD-10-CM

## 2023-09-14 DIAGNOSIS — Z87.891 FORMER SMOKER: ICD-10-CM

## 2023-09-14 DIAGNOSIS — J96.11 CHRONIC RESPIRATORY FAILURE WITH HYPOXIA (HCC): Chronic | ICD-10-CM

## 2023-09-14 DIAGNOSIS — G47.33 OBSTRUCTIVE SLEEP APNEA: Chronic | ICD-10-CM

## 2023-09-14 PROCEDURE — 94726 PLETHYSMOGRAPHY LUNG VOLUMES: CPT | Performed by: NURSE PRACTITIONER

## 2023-09-14 PROCEDURE — 90662 IIV NO PRSV INCREASED AG IM: CPT

## 2023-09-14 PROCEDURE — 94060 EVALUATION OF WHEEZING: CPT | Mod: 26 | Performed by: INTERNAL MEDICINE

## 2023-09-14 PROCEDURE — 94726 PLETHYSMOGRAPHY LUNG VOLUMES: CPT | Mod: 26 | Performed by: INTERNAL MEDICINE

## 2023-09-14 PROCEDURE — 94060 EVALUATION OF WHEEZING: CPT | Performed by: NURSE PRACTITIONER

## 2023-09-14 PROCEDURE — 99215 OFFICE O/P EST HI 40 MIN: CPT | Performed by: NURSE PRACTITIONER

## 2023-09-14 PROCEDURE — 94729 DIFFUSING CAPACITY: CPT | Performed by: NURSE PRACTITIONER

## 2023-09-14 PROCEDURE — 94729 DIFFUSING CAPACITY: CPT | Mod: 26 | Performed by: INTERNAL MEDICINE

## 2023-09-14 PROCEDURE — 99212 OFFICE O/P EST SF 10 MIN: CPT | Mod: 25 | Performed by: NURSE PRACTITIONER

## 2023-09-14 PROCEDURE — 3078F DIAST BP <80 MM HG: CPT | Performed by: NURSE PRACTITIONER

## 2023-09-14 PROCEDURE — 3075F SYST BP GE 130 - 139MM HG: CPT | Performed by: NURSE PRACTITIONER

## 2023-09-14 RX ORDER — MEMANTINE HYDROCHLORIDE 10 MG/1
10 TABLET ORAL 2 TIMES DAILY
COMMUNITY
Start: 2023-08-09

## 2023-09-14 ASSESSMENT — PULMONARY FUNCTION TESTS
FEV1: 1.95
FEV1/FVC_PREDICTED: 75
FVC_LLN: 2.75
FVC_PERCENT_PREDICTED: 134
FEV1/FVC: 44.02
FEV1/FVC_PERCENT_PREDICTED: 74
FEV1/FVC_PERCENT_LLN: 63
FEV1_PERCENT_CHANGE: 10
FEV1_PERCENT_PREDICTED: 70
FVC_PREDICTED: 3.29
FEV1_PERCENT_PREDICTED: 79
FEV1/FVC_PERCENT_CHANGE: 4
FEV1/FVC_PERCENT_CHANGE: 90
FEV1_LLN: 2.04
FEV1/FVC: 42
FVC: 4.09
FEV1/FVC_PERCENT_PREDICTED: 56
FVC: 4.43
FEV1/FVC_PERCENT_PREDICTED: 56
FEV1: 1.73
FEV1/FVC: 44
FEV1/FVC_PERCENT_PREDICTED: 59
FVC_PERCENT_PREDICTED: 124
FEV1/FVC: 42
FEV1/FVC_PERCENT_PREDICTED: 58
FEV1_PERCENT_CHANGE: 9
FEV1_PREDICTED: 2.45

## 2023-09-14 ASSESSMENT — FIBROSIS 4 INDEX
FIB4 SCORE: 1.16
FIB4 SCORE: 1.16

## 2023-09-14 NOTE — PROCEDURES
Technician: Marii Callaway RRT, CPFT  Good patient effort & cooperation.  The results of this test meet the ATS/ERS standards for acceptability & reproducibility.  Test was performed on the [employer]rend[employer] Body Plethysmograph-Elite DX system.  Predicted equations for Spirometry are GLI-2012, ITS for lung volumes, and GLI-2017 for DLCO.  The DLCO was uncorrected for Hgb.  A bronchodilator of Ventolin HFA -2puffs via spacer administered.  DLCO performed during dilation period.     Interpretation:  There is a mild obstructive ventilatory defect on spirometry.  There is no significant bronchodilator response.  This is consistent with the reported diagnosis of COPD.    Lung volumes demonstrate air trapping and hyperinflation, evidenced by the elevated RV and RV/TLC ratio.    There is mild diffusion impairment.  The reduced DLCO and DL/VA ratio are consistent with pulmonary emphysema.    Flow-volume loop is consistent with the above interpretation.    Compared to prior testing in 2022, the FEV1 has improved slightly from 1.59 L to 1.73 L, or 70% predicted.      IEmiliano M.D. am the author of this note (PFT interpretation).    __________  Emiliano Scott MD  Pulmonary and Critical Care Medicine  [facility]

## 2023-09-14 NOTE — PROGRESS NOTES
"Chief Complaint   Patient presents with    Follow-Up     COPD / JEFF // last seen 3/14/2023         HPI:  Danielito Tolliver is a 82 y.o. year old male here today for follow-up on moderate COPD and JEFF. Hx of pulmonary nodules that were stable >2yrs and patient deferred repeat imaging.  Last OV 3/14/23.     Accompanied by his daughter who helps with his care.    MMR[facility] stGstrstastdstest:st st1st Exacerbations this year: 0    CNOX 3/30/23 auto CPAP 5 to 15 cm with 4 L oxygen bleed in indicates basal spo2 95.5%, <88% 1min of the night.  Reviewed with patient.    PFT 1/14/2023 notes moderate obstructive ventilatory defect with FEV1 1.73 L or 70%, FEV1/FVC ratio 42, RV 3.82 L 152%, TLC 7.66 L 122% and DLCO 63% predicted with significant bronchodilator response noted.  Compared to November 2022 study slight improvement in FVC, FEV1 and TLC.  Reviewed with patient.    Remains on Breztri 2 puffs twice daily and RESHMA as needed.  He notes he was taking his morning dose but misses his evening dose quite regularly.  We reviewed proper use.  No regular use of RESHMA.  He notes shortness of breath mainly when lifting heavy objects in his shop and taking 3 to 4 minutes to sit down to recover.  No significant cough, phlegm, chest pain, chest tightness or wheezing.  He has had good oxygen levels at home.  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  He notes having \"catnaps\" but does not take long naps in duration for more than 30 minutes.  Reviewed sleep hygiene.    He continues to use auto CPAP 5 to 15 cm with 4 L oxygen bleed and nightly; ResMed device obtained 2021.  Compliance report 8/15/2023 through 9/13/2023 indicates 97% compliance, average nightly use of 7 hours 53 minutes, mean pressure 12.5 cm, minimal mask leak with a reduced AHI of 2.0/h.  Reviewed with patient.  He feels overall he sleeps well in therapy but he will have fragmented sleep at times where he will wake for an hour and watch TV and then " resume sleep until the morning.  Sometimes he sleeps through the night.    PULM & SLEEP HX:   Former smoker, quit 1998 with 25PYH.   PFT 5/15/2017 indicates FVC 4.74 L or 135% predicted, FEV1 2.53 L or 100% predicted, FEV1/FVC ratio 53, %, DLCO 90% predicted.   Spirometry 6/5/2019 indicates FEV1 2.06 L or 84%, FEV1/FVC ratio 47%, and mid flows at 37%.  Patient did a significant bronchodilator response.  [place] 11/6/2019 shows further decline with FEV1 1.87 L or 77%, FEV1/FVC ratio 51%.  Patient did have a significant bronchodilator response.  I reviewed finds with patient.  PFT 2/25/20 noted FEV1 2.16L or 84%, FEV1/FVC ratio 49, % and DLCO 84%.  PFT 3/11/21 notes FEV1 2.06L or 81%, FEV1/FVC ratio 48, %, and DLCO 78%. No significant bronchodilator response.   PFT results today noted moderate COPD with decline noted from prior PFT March 2021.  FVC 3.5 L 105%, FEV1 1.59 L or 64%, FEV1/FVC ratio 45, %, TLC 7.05 L 112% and DLCO 65% predicted.  He did have a significant bronchodilator response.       Patient underwent heart cath 2017 and started having weight loss. His dyspnea significanly improved and he stopped inhalers.   Echo 9/15/2022 noted mild concentric left ventricular hypertrophy, normal left ventricular systolic function with LVEF 60 to 65%.  Mild MR and RVSP 55 to 60 mmHg.  Reviewed with patient.  PET scan 3/11/2022 noted focal FDG accumulation in right prostate noting SUV 11.9.  Focal uptake in right ischium 6.6.  He is currently under the care of [facility] and receiving injection therapy.      He has a history of an abnormal CT scan with pulmonary nodules. The nodules were stable for 2 years; last CT was 7/21/2009. CT scan at [facility] 2/23/16 indicates 8mm noncalcified pulmonary nodule in the right middle lobe and severe centrilobular emphysema. Addendum noted stability of nodule and no more imaging warranted.   CTA of chest 9/20/2022 that noted no PE, pulmonary emphysema and  "prior 7 to 8 mm nodule present along minor fissure.  This was not compared to prior imaging but appears to be the same nodule based on review of prior reports.  Patient declines any further imaging at this time     HST 2013 indicated an overall AHI of 15 with a minimum oxygen saturation of 77%.  Patient currently uses RESMED auto CPAP 5-15cm H2O with 4 L oxygen bleed in; OBTAINED 2021. He will continue to benefit from night time O2 therapy.  He now has his old device in [place] for when he travels to see his wife.  CNOX 10/17/2015 indicated normal oximetry on that setting.    ROS: As per HPI and otherwise negative if not stated.    Past Medical History:   Diagnosis Date    Breath shortness     Uses 02 @ 4L per nc at home prn     CATARACT     IOL OU    Cold     coughing up \"dirty\" sputum    Dental disorder     Full Upper    Emphysema of lung (HCC)     Hypercholesteremia     Indigestion     Infection     left knee post TKA    JEFF (obstructive sleep apnea)     AUTO CPAP 5-15CM WITH O2 4 LPM    Pain     low back pain left side    Psychiatric problem     takes med for depression    Snoring     Uses CPAP       Past Surgical History:   Procedure Laterality Date    KNEE ARTHROTOMY Left 3/24/2016    Procedure: KNEE ARTHROTOMY-HARDWARE REMOVAL AND HETEROTOPIC OSSIFICATION;  Surgeon: Yasmani Bradley M.D.;  Location: SURGERY [facility];  Service:     OTHER ORTHOPEDIC SURGERY  3-21-16    \"Knee ReplacementSurgeries Left kneex3 Rightx2\"     OTHER CARDIAC SURGERY  2013    \"Open Heart-Coronary Artery Bypass, Heart Stent\"    BRONCHOSCOPY-ENDO  3/25/2009    Performed by SINCERE PABLO at [facility]    CATARACT EXTRACTION WITH IOL      B/L    OTHER      TKA B/L       History reviewed. No pertinent family history.    Social History     Socioeconomic History    Marital status:      Spouse name: Not on file    Number of children: Not on file    Years of education: Not on file    Highest education level: Not on " "file   Occupational History    Not on file   Tobacco Use    Smoking status: Former     Current packs/day: 0.00     Average packs/day: 1 pack/day for 25.0 years (25.0 ttl pk-yrs)     Types: Cigarettes     Start date: 1973     Quit date: 1998     Years since quittin.7    Smokeless tobacco: Never   Substance and Sexual Activity    Alcohol use: No     Alcohol/week: 0.0 oz    Drug use: No    Sexual activity: Not on file   Other Topics Concern    Not on file   Social History Narrative    Not on file     Social Determinants of Health     Financial Resource Strain: Not on file   Food Insecurity: Not on file   Transportation Needs: Not on file   Physical Activity: Not on file   Stress: Not on file   Social Connections: Not on file   Intimate Partner Violence: Not on file   Housing Stability: Not on file       Allergies as of 2023 - Reviewed 2023   Allergen Reaction Noted    Morphine Anxiety 2016        Vitals:  /70 (BP Location: Left arm, Patient Position: Sitting, BP Cuff Size: Adult)   Pulse 62   Resp 16   Ht 1.676 m (5' 6\")   Wt 99.3 kg (219 lb)   SpO2 94%     Current medications as of today   Current Outpatient Medications   Medication Sig Dispense Refill    BREZTRI AEROSPHERE 160-9-4.8 MCG/ACT Aerosol INHALE 2 PUFFS BY MOUTH TWICE DAILY 10.7 g 5    furosemide (LASIX) 20 MG Tab       nitroglycerin (NITROSTAT) 0.4 MG SL Tab 1 tablet under the tongue and allow to dissolve as needed Sublingual every 5 min as needed for severe chest pain      prasugrel (EFFIENT) 10 MG Tab Take 10 mg by mouth every day.      Respiratory Therapy Supplies (CARETOUCH 2 CPAP HOSE ) Misc CPAP Machine 3      tamsulosin (FLOMAX) 0.4 MG capsule 1 capsule daily      sertraline (ZOLOFT) 50 MG Tab Take 50 mg by mouth every day.      albuterol 108 (90 Base) MCG/ACT Aero Soln inhalation aerosol INHALE 2 PUFFS BY MOUTH EVERY 6 HOURS AS NEEDED FOR SHORTNESS OF BREATH 3 Each 3    aspirin 81 MG tablet Take 81 mg " by mouth every day.      simvastatin (ZOCOR) 80 MG tablet Take 80 mg by mouth every day.      Multiple Vitamins-Minerals (OCUVITE PO) Take 1 Cap by mouth every day.      Multiple Vitamins-Minerals (CENTRUM SILVER ADULT 50+ PO) Take 1 Tab by mouth every day.      Omega-3 Fatty Acids (FISH OIL PO) Take 1 Cap by mouth every day.      Coenzyme Q10 (COQ-10) 100 MG Cap Take 1 Cap by mouth every day.      Ascorbic Acid (VITAMIN C PO) Take 500 mg by mouth every day.      lisinopril (PRINIVIL) 5 MG Tab Take 5 mg by mouth every day.      Cobalamine Combinations (VITAMIN B12-FOLIC ACID PO) Take 1 Tab by mouth every day.      CEPHALEXIN 500 MG PO CAPS Take 1,000 mg by mouth every day. For an infection in the leg for 7 years.      memantine (NAMENDA) 10 MG Tab Take 10 mg by mouth 2 times a day.       No current facility-administered medications for this visit.         Physical Exam:   Gen:           Alert and oriented, No apparent distress. Mood and affect appropriate, normal interaction with examiner.  Eyes:          PERRL, EOM intact, sclere white, conjunctive moist.  Ears:          Not examined.   Hearing:     Grossly intact.  Nose:          Normal, no lesions or deformities.  Dentition:    Not examined.   Oropharynx:   Not examined.   Mallampati Classification: Not examined.   Neck:        Supple, trachea midline, no masses.  Respiratory Effort: No intercostal retractions or use of accessory muscles.   Lung Auscultation:      Clear to auscultation bilaterally; no rales, rhonchi or wheezing.  CV:            Regular rate and rhythm. No murmurs, rubs or gallops.  Abd:           Not examined.   Lymphadenopathy: Not examined.   Gait and Station: Normal.  Digits and Nails: No clubbing, cyanosis, petechiae, or nodes.   Cranial Nerves: II-XII grossly intact.  Skin:        No rashes, lesions or ulcers noted.               Ext:           No cyanosis or edema.      Assessment:  1. Chronic obstructive pulmonary disease, unspecified COPD  type (HCC)        2. Chronic respiratory failure with hypoxia (HCC)        3. Obstructive sleep apnea        4. Pulmonary nodules      history of; stable and no further imaging required since last CT      5. BMI 35.0-35.9,adult        6. Former smoker                 Immunizations:    Flu:given today  Pneumovax 23:2014  Prevnar 13:2015  PCV 20: not due  COVID-19: 3/24/21    Plan:  COPD is clinically stable and slightly improved compared to last PFT in 2022.  He will continue to benefit from bronchodilators.  Reviewed proper use.   Influenza vaccination given today  Continue oxygen on exertion as needed but mainly with bleed in of CPAP.  Continue auto CPAP 5 to 15 cm with 4 L oxygen bleed in at night.  Reviewed sleep hygiene.  No further imaging required at this time.  Patient deferred in the past due to stability greater than 2 years.  Encourage weight loss with healthy diet and regular activity.  Follow-up with primary care for other health concerns.  Follow-up in 6 months to review symptoms and compliance report, sooner if needed.  Advised patient to contact me via [employer] sooner with any questions or concerns.    Please note that this dictation was created using voice recognition software. I have made every reasonable attempt to correct obvious errors, but it is possible there are errors of grammar and possibly content that I did not discover before finalizing the note.

## 2023-11-02 ENCOUNTER — TELEPHONE (OUTPATIENT)
Dept: SLEEP MEDICINE | Facility: MEDICAL CENTER | Age: 83
End: 2023-11-02
Payer: COMMERCIAL

## 2023-11-02 NOTE — TELEPHONE ENCOUNTER
1. Caller Name: Danielito Tolliver                 Call Back Number: [phone] (home)       Patient approves a detailed voicemail message: N\A    2.  What is the procedure: EGD with Deep (propofol) sedation     3.  When is the procedure scheduled: 11/1/2023     4.  Who is the Surgeon: Ramirez Schwab M.D.    5. Has the patient completed any screening tests for the procedure: No     6. Has PCP received a written request from Surgeon: no    7. Patient scheduled for an appointment for this clearance?:  no    8. Last OV: 9/14/2023 LEILANI Martin APRN     9. Next OV: 3/12/2024 LEILANI Martin APRN     10. Last CXR: DX-CHEST-2 VIEWS  2/25/2020     11. Last PFT: 9/14/2023     12. Last CT CT-CTA CHEST WITH & W/O-POST PROCESS 11/2/2023     Current Medications  Current Outpatient Medications   Medication Sig Dispense Refill    memantine (NAMENDA) 10 MG Tab Take 10 mg by mouth 2 times a day.      BREZTRI AEROSPHERE 160-9-4.8 MCG/ACT Aerosol INHALE 2 PUFFS BY MOUTH TWICE DAILY 10.7 g 5    furosemide (LASIX) 20 MG Tab       nitroglycerin (NITROSTAT) 0.4 MG SL Tab 1 tablet under the tongue and allow to dissolve as needed Sublingual every 5 min as needed for severe chest pain      prasugrel (EFFIENT) 10 MG Tab Take 10 mg by mouth every day.      Respiratory Therapy Supplies (CARETOUCH 2 CPAP HOSE ) Misc CPAP Machine 3      tamsulosin (FLOMAX) 0.4 MG capsule 1 capsule daily      sertraline (ZOLOFT) 50 MG Tab Take 50 mg by mouth every day.      albuterol 108 (90 Base) MCG/ACT Aero Soln inhalation aerosol INHALE 2 PUFFS BY MOUTH EVERY 6 HOURS AS NEEDED FOR SHORTNESS OF BREATH 3 Each 3    aspirin 81 MG tablet Take 81 mg by mouth every day.      simvastatin (ZOCOR) 80 MG tablet Take 80 mg by mouth every day.      Multiple Vitamins-Minerals (OCUVITE PO) Take 1 Cap by mouth every day.      Multiple Vitamins-Minerals (CENTRUM SILVER ADULT 50+ PO) Take 1 Tab by mouth every day.      Omega-3 Fatty Acids (FISH OIL PO) Take 1 Cap by mouth every day.       Coenzyme Q10 (COQ-10) 100 MG Cap Take 1 Cap by mouth every day.      Ascorbic Acid (VITAMIN C PO) Take 500 mg by mouth every day.      lisinopril (PRINIVIL) 5 MG Tab Take 5 mg by mouth every day.      Cobalamine Combinations (VITAMIN B12-FOLIC ACID PO) Take 1 Tab by mouth every day.      CEPHALEXIN 500 MG PO CAPS Take 1,000 mg by mouth every day. For an infection in the leg for 7 years.       No current facility-administered medications for this visit.       Please advise.

## 2023-12-05 ENCOUNTER — HOSPITAL ENCOUNTER (OUTPATIENT)
Dept: RADIOLOGY | Facility: MEDICAL CENTER | Age: 83
End: 2023-12-05
Payer: COMMERCIAL

## 2023-12-05 DIAGNOSIS — R13.19 OTHER DYSPHAGIA: ICD-10-CM

## 2023-12-05 PROCEDURE — 700112 HCHG RX REV CODE 229

## 2023-12-05 PROCEDURE — 700117 HCHG RX CONTRAST REV CODE 255

## 2023-12-05 PROCEDURE — A9270 NON-COVERED ITEM OR SERVICE: HCPCS

## 2023-12-05 PROCEDURE — 74221 X-RAY XM ESOPHAGUS 2CNTRST: CPT

## 2023-12-05 RX ADMIN — BARIUM SULFATE 700 MG: 700 TABLET ORAL at 14:36

## 2023-12-05 RX ADMIN — ANTACID/ANTIFLATULENT 1 PACKET: 380; 550; 10; 10 GRANULE, EFFERVESCENT ORAL at 14:36

## 2023-12-28 DIAGNOSIS — J44.9 CHRONIC OBSTRUCTIVE PULMONARY DISEASE, UNSPECIFIED COPD TYPE (HCC): ICD-10-CM

## 2023-12-28 NOTE — TELEPHONE ENCOUNTER
Have we ever prescribed this med? Yes.  If yes, what date? 01/09/2023 LEILANI Martin APRN    Last OV: 09/14/2023 LEILANI SOMMER    Next OV: 03/13/2024 LEILANI SOMMER    DX: Chronic obstructive pulmonary disease, unspecified COPD type     Medications: BREZTRI AEROSPHERE 160-9-4.8 MCG/ACT Aerosol

## 2024-01-01 RX ORDER — BUDESONIDE, GLYCOPYRROLATE, AND FORMOTEROL FUMARATE 160; 9; 4.8 UG/1; UG/1; UG/1
AEROSOL, METERED RESPIRATORY (INHALATION)
Qty: 10.7 G | Refills: 5 | Status: SHIPPED | OUTPATIENT
Start: 2024-01-01

## 2024-03-13 ENCOUNTER — OFFICE VISIT (OUTPATIENT)
Dept: SLEEP MEDICINE | Facility: MEDICAL CENTER | Age: 84
End: 2024-03-13
Attending: NURSE PRACTITIONER
Payer: COMMERCIAL

## 2024-03-13 VITALS
BODY MASS INDEX: 35.2 KG/M2 | DIASTOLIC BLOOD PRESSURE: 70 MMHG | WEIGHT: 219 LBS | HEART RATE: 70 BPM | RESPIRATION RATE: 14 BRPM | OXYGEN SATURATION: 93 % | HEIGHT: 66 IN | SYSTOLIC BLOOD PRESSURE: 128 MMHG

## 2024-03-13 DIAGNOSIS — Z87.891 FORMER SMOKER: ICD-10-CM

## 2024-03-13 DIAGNOSIS — G47.33 OBSTRUCTIVE SLEEP APNEA: Chronic | ICD-10-CM

## 2024-03-13 DIAGNOSIS — J44.9 CHRONIC OBSTRUCTIVE PULMONARY DISEASE, UNSPECIFIED COPD TYPE (HCC): Chronic | ICD-10-CM

## 2024-03-13 DIAGNOSIS — J96.11 CHRONIC RESPIRATORY FAILURE WITH HYPOXIA (HCC): Chronic | ICD-10-CM

## 2024-03-13 DIAGNOSIS — J43.2 CENTRILOBULAR EMPHYSEMA (HCC): ICD-10-CM

## 2024-03-13 PROCEDURE — 99213 OFFICE O/P EST LOW 20 MIN: CPT | Performed by: NURSE PRACTITIONER

## 2024-03-13 PROCEDURE — 3074F SYST BP LT 130 MM HG: CPT | Performed by: NURSE PRACTITIONER

## 2024-03-13 PROCEDURE — 3078F DIAST BP <80 MM HG: CPT | Performed by: NURSE PRACTITIONER

## 2024-03-13 PROCEDURE — 99214 OFFICE O/P EST MOD 30 MIN: CPT | Performed by: NURSE PRACTITIONER

## 2024-03-13 RX ORDER — ALBUTEROL SULFATE 90 UG/1
2 AEROSOL, METERED RESPIRATORY (INHALATION) EVERY 6 HOURS PRN
Qty: 3 EACH | Refills: 3 | Status: SHIPPED | OUTPATIENT
Start: 2024-03-13

## 2024-03-13 ASSESSMENT — FIBROSIS 4 INDEX: FIB4 SCORE: 1.17

## 2024-03-13 ASSESSMENT — PATIENT HEALTH QUESTIONNAIRE - PHQ9: CLINICAL INTERPRETATION OF PHQ2 SCORE: 0

## 2024-03-13 NOTE — PATIENT INSTRUCTIONS
Use albuterol inhaler prior to using Breztri inhaler and use prior to going outside or doing activity    Use oxygen if levels <90%

## 2024-03-13 NOTE — PROGRESS NOTES
Chief Complaint   Patient presents with    Follow-Up     COPD / JEFF // last seen 9/14/2023      Other     Has questions regarding Breztri does not feel like its working.        HPI:  Danielito Tolliver is a 83 y.o. year old male here today for follow-up on moderate COPD and JEFF. Hx of pulmonary nodules that were stable >2yrs and patient deferred repeat imaging.  Last OV 9/14/23    He is accompanied by his daughter who helps with hx.     MMRC stGstrstastdstest:st st1st Exacerbations this year: 0    He continues to use auto CPAP 5 to 15 cm with 4 L oxygen bleed and nightly; ResMed device obtained 2021.  Compliance report 2/12/2024 through 3/12/2024 [place] 100% compliance, average nightly use 8 hours 15 minutes, pressure 11.5 cm, significant mask leak with overall AHI of 2.7/h.  Reviewed with patient.    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  He notes being sedentary during the winter and generally being side.  He stopped doing his stationary bike.  He has albuterol HFA inhaler in multiple places but has not reached for it.  He notes shortness of breath with exertion and general activity.  He is using Breztri 2 puffs twice daily with spacer but unsure if it is working correctly since he is no longer coughing up things or phlegm.  He denies chest pain, chest tightness or wheezing.  No recent exacerbations.    He continues to sleep on therapy and has no complaints.    PULM & SLEEP HX:   Former smoker, quit 1998 with 25PYH.   PFT 5/15/2017 indicates FVC 4.74 L or 135% predicted, FEV1 2.53 L or 100% predicted, FEV1/FVC ratio 53, %, DLCO 90% predicted.   Spirometry 6/5/2019 indicates FEV1 2.06 L or 84%, FEV1/FVC ratio 47%, and mid flows at 37%.  Patient did a significant bronchodilator response.  Cipriano 11/6/2019 shows further decline with FEV1 1.87 L or 77%, FEV1/FVC ratio 51%.  Patient did have a significant bronchodilator response.   I reviewed finds with patient.  PFT 2/25/20 noted FEV1 2.16L or 84%, FEV1/FVC ratio 49, % and DLCO 84%.  PFT 3/11/21 notes FEV1 2.06L or 81%, FEV1/FVC ratio 48, %, and DLCO 78%. No significant bronchodilator response.   PFT results today noted moderate COPD with decline noted from prior PFT March 2021.  FVC 3.5 L 105%, FEV1 1.59 L or 64%, FEV1/FVC ratio 45, %, TLC 7.05 L 112% and DLCO 65% predicted.  He did have a significant bronchodilator response.   PFT 1/14/2023 notes moderate obstructive ventilatory defect with FEV1 1.73 L or 70%, FEV1/FVC ratio 42, RV 3.82 L 152%, TLC 7.66 L 122% and DLCO 63% predicted with significant bronchodilator response noted. Compared to November 2022 study slight improvement in FVC, FEV1 and TLC.       Patient underwent heart cath 2017 and started having weight loss. His dyspnea significanly improved and he stopped inhalers.   Echo 9/15/2022 noted mild concentric left ventricular hypertrophy, normal left ventricular systolic function with LVEF 60 to 65%.  Mild MR and RVSP 55 to 60 mmHg.  Reviewed with patient.  PET scan 3/11/2022 noted focal FDG accumulation in right prostate noting SUV 11.9.  Focal uptake in right ischium 6.6.  He is currently under the care of [facility] and receiving injection therapy.      He has a history of an abnormal CT scan with pulmonary nodules. The nodules were stable for 2 years; last CT was 7/21/2009. CT scan at [facility] 2/23/16 indicates 8mm noncalcified pulmonary nodule in the right middle lobe and severe centrilobular emphysema. Addendum noted stability of nodule and no more imaging warranted.   CTA of chest 9/20/2022 that noted no PE, pulmonary emphysema and prior 7 to 8 mm nodule present along minor fissure.  This was not compared to prior imaging but appears to be the same nodule based on review of prior reports.  Patient declines any further imaging at this time     HST 2013 indicated an overall AHI of 15 with a minimum  "oxygen saturation of 77%.  Patient currently uses RESMED auto CPAP 5-15cm H2O with 4 L oxygen bleed in; OBTAINED 2021. He will continue to benefit from night time O2 therapy.  He now has his old device in [place] for when he travels to see his wife.  CNOX 10/17/2015 indicated normal oximetry on that setting.  CNOX 3/30/23 auto CPAP 5 to 15 cm with 4 L oxygen bleed in indicates basal spo2 95.5%, <88% 1min of the night.     ROS: As per HPI and otherwise negative if not stated.    Past Medical History:   Diagnosis Date    Breath shortness     Uses 02 @ 4L per nc at home prn     CATARACT     IOL OU    Cold     coughing up \"dirty\" sputum    Dental disorder     Full Upper    Emphysema of lung (HCC)     Hypercholesteremia     Indigestion     Infection     left knee post TKA    JEFF (obstructive sleep apnea)     AUTO CPAP 5-15CM WITH O2 4 LPM    Pain     low back pain left side    Psychiatric problem     takes med for depression    Snoring     Uses CPAP       Past Surgical History:   Procedure Laterality Date    KNEE ARTHROTOMY Left 3/24/2016    Procedure: KNEE ARTHROTOMY-HARDWARE REMOVAL AND HETEROTOPIC OSSIFICATION;  Surgeon: Yasmani Bradley M.D.;  Location: SURGERY [facility];  Service:     OTHER ORTHOPEDIC SURGERY  3-21-16    \"Knee ReplacementSurgeries Left kneex3 Rightx2\"     OTHER CARDIAC SURGERY  2013    \"Open Heart-Coronary Artery Bypass, Heart Stent\"    BRONCHOSCOPY-ENDO  3/25/2009    Performed by SINCERE PABLO at [facility]    CATARACT EXTRACTION WITH IOL      B/L    OTHER      TKA B/L       History reviewed. No pertinent family history.    Social History     Socioeconomic History    Marital status:      Spouse name: Not on file    Number of children: Not on file    Years of education: Not on file    Highest education level: Not on file   Occupational History    Not on file   Tobacco Use    Smoking status: Former     Current packs/day: 0.00     Average packs/day: 1 pack/day for 25.0 " "years (25.0 ttl pk-yrs)     Types: Cigarettes     Start date: 1973     Quit date: 1998     Years since quittin.2    Smokeless tobacco: Never   Substance and Sexual Activity    Alcohol use: No     Alcohol/week: 0.0 oz    Drug use: No    Sexual activity: Not on file   Other Topics Concern    Not on file   Social History Narrative    Not on file     Social Determinants of Health     Financial Resource Strain: Not on file   Food Insecurity: Not on file   Transportation Needs: Not on file   Physical Activity: Not on file   Stress: Not on file   Social Connections: Not on file   Intimate Partner Violence: Not on file   Housing Stability: Not on file       Allergies as of 2024 - Reviewed 2024   Allergen Reaction Noted    Morphine Anxiety 2016        Vitals:  /70 (BP Location: Left arm, Patient Position: Sitting, BP Cuff Size: Adult)   Pulse 70   Resp 14   Ht 1.676 m (5' 6\")   Wt 99.3 kg (219 lb)   SpO2 93%     Current medications as of today   Current Outpatient Medications   Medication Sig Dispense Refill    albuterol 108 (90 Base) MCG/ACT Aero Soln inhalation aerosol Inhale 2 Puffs every 6 hours as needed for Shortness of Breath. 3 Each 3    BREZTRI AEROSPHERE 160-9-4.8 MCG/ACT Aerosol INHALE 2 PUFFS BY MOUTH TWICE DAILY 10.7 g 5    memantine (NAMENDA) 10 MG Tab Take 10 mg by mouth 2 times a day.      furosemide (LASIX) 20 MG Tab       nitroglycerin (NITROSTAT) 0.4 MG SL Tab 1 tablet under the tongue and allow to dissolve as needed Sublingual every 5 min as needed for severe chest pain      prasugrel (EFFIENT) 10 MG Tab Take 10 mg by mouth every day.      Respiratory Therapy Supplies (CARETOUCH 2 CPAP HOSE ) Misc CPAP Machine 3      tamsulosin (FLOMAX) 0.4 MG capsule 1 capsule daily      sertraline (ZOLOFT) 50 MG Tab Take 50 mg by mouth every day.      aspirin 81 MG tablet Take 81 mg by mouth every day.      simvastatin (ZOCOR) 80 MG tablet Take 80 mg by mouth every day.   "    Multiple Vitamins-Minerals (OCUVITE PO) Take 1 Cap by mouth every day.      Multiple Vitamins-Minerals (CENTRUM SILVER ADULT 50+ PO) Take 1 Tab by mouth every day.      Omega-3 Fatty Acids (FISH OIL PO) Take 1 Cap by mouth every day.      Coenzyme Q10 (COQ-10) 100 MG Cap Take 1 Cap by mouth every day.      Ascorbic Acid (VITAMIN C PO) Take 500 mg by mouth every day.      lisinopril (PRINIVIL) 5 MG Tab Take 5 mg by mouth every day.      Cobalamine Combinations (VITAMIN B12-FOLIC ACID PO) Take 1 Tab by mouth every day.      CEPHALEXIN 500 MG PO CAPS Take 1,000 mg by mouth every day. For an infection in the leg for 7 years.       No current facility-administered medications for this visit.         Physical Exam:   Gen:           Alert and oriented, No apparent distress. Mood and affect appropriate, normal interaction with examiner.  Eyes:          PERRL, EOM intact, sclere white, conjunctive moist.  Ears:          Not examined.   Hearing:     Grossly intact.  Nose:          Normal, no lesions or deformities.  Dentition:    Not examined.   Oropharynx:   Not examined.   Mallampati Classification: Not examined.   Neck:        Supple, trachea midline, no masses.  Respiratory Effort: No intercostal retractions or use of accessory muscles.   Lung Auscultation:      Clear to auscultation bilaterally; no rales, rhonchi or wheezing.  CV:            Regular rate and rhythm. No murmurs, rubs or gallops.  Abd:           Not examined.   Lymphadenopathy: Not examined.   Gait and Station: Normal.  Digits and Nails: No clubbing, cyanosis, petechiae, or nodes.   Cranial Nerves: II-XII grossly intact.  Skin:        No rashes, lesions or ulcers noted.               Ext:           No cyanosis or edema.      Assessment:  1. Chronic obstructive pulmonary disease, unspecified COPD type (HCC)        2. Obstructive sleep apnea  DME Mask and Supplies      3. Chronic respiratory failure with hypoxia (HCC)        4. BMI 35.0-35.9,adult  HEIGHT  AND WEIGHT      5. Former smoker        6. Centrilobular emphysema (HCC)  albuterol 108 (90 Base) MCG/ACT Aero Soln inhalation aerosol               Immunizations:    Flu:9/14/23  Pneumovax 23:2014  Prevnar 13:2015  PCV 20: not due  COVID-19: 2021    Plan:  COPD is clinically stable but he is noticing increased shortness of breath.  Reviewed proper use of inhalers and start using albuterol HFA inhaler prior to exertion and if he becomes short of breath.  Rx refilled.  Encouraged increased bicycle riding again for conditioning to improve shortness of breath.  JEFF is well-controlled.  He will continue to benefit from machine with 4L oxygen bleed in.   DME mask/supplies  Patient continues to benefit from oxygen during the day as needed.  Encouraged weight loss or healthy diet and activity.  Encourage restarting stationary bike exercise 30 minutes daily.  Follow-up with primary care for the health concerns.  6 months review symptoms and compliance report, sooner if needed.    Please note that this dictation was created using voice recognition software. I have made every reasonable attempt to correct obvious errors, but it is possible there are errors of grammar and possibly content that I did not discover before finalizing the note.

## 2024-09-16 ENCOUNTER — OFFICE VISIT (OUTPATIENT)
Dept: SLEEP MEDICINE | Facility: MEDICAL CENTER | Age: 84
End: 2024-09-16
Attending: INTERNAL MEDICINE
Payer: COMMERCIAL

## 2024-09-16 VITALS
DIASTOLIC BLOOD PRESSURE: 68 MMHG | WEIGHT: 210 LBS | HEIGHT: 68 IN | SYSTOLIC BLOOD PRESSURE: 120 MMHG | OXYGEN SATURATION: 91 % | HEART RATE: 59 BPM | BODY MASS INDEX: 31.83 KG/M2

## 2024-09-16 DIAGNOSIS — R91.8 PULMONARY NODULES: ICD-10-CM

## 2024-09-16 DIAGNOSIS — G47.33 OBSTRUCTIVE SLEEP APNEA: ICD-10-CM

## 2024-09-16 DIAGNOSIS — J44.9 CHRONIC OBSTRUCTIVE PULMONARY DISEASE, UNSPECIFIED COPD TYPE (HCC): ICD-10-CM

## 2024-09-16 DIAGNOSIS — Z87.891 FORMER SMOKER: ICD-10-CM

## 2024-09-16 DIAGNOSIS — J96.11 CHRONIC RESPIRATORY FAILURE WITH HYPOXIA (HCC): ICD-10-CM

## 2024-09-16 PROCEDURE — 3074F SYST BP LT 130 MM HG: CPT | Performed by: INTERNAL MEDICINE

## 2024-09-16 PROCEDURE — 99212 OFFICE O/P EST SF 10 MIN: CPT | Performed by: INTERNAL MEDICINE

## 2024-09-16 PROCEDURE — 99214 OFFICE O/P EST MOD 30 MIN: CPT | Performed by: INTERNAL MEDICINE

## 2024-09-16 PROCEDURE — 3078F DIAST BP <80 MM HG: CPT | Performed by: INTERNAL MEDICINE

## 2024-09-16 RX ORDER — IPRATROPIUM BROMIDE AND ALBUTEROL SULFATE 2.5; .5 MG/3ML; MG/3ML
3 SOLUTION RESPIRATORY (INHALATION) EVERY 4 HOURS PRN
Qty: 120 ML | Refills: 11 | Status: SHIPPED | OUTPATIENT
Start: 2024-09-16

## 2024-09-16 ASSESSMENT — ENCOUNTER SYMPTOMS
TREMORS: 0
SORE THROAT: 0
WHEEZING: 0
CONSTIPATION: 0
NAUSEA: 0
PALPITATIONS: 0
ABDOMINAL PAIN: 0
SPUTUM PRODUCTION: 0
HEADACHES: 0
DIZZINESS: 0
HEARTBURN: 0
ORTHOPNEA: 0
HEMOPTYSIS: 0
WEAKNESS: 0
BACK PAIN: 0
VOMITING: 0
CLAUDICATION: 0
CHILLS: 0
EYE DISCHARGE: 0
EYE PAIN: 0
SPEECH CHANGE: 0
WEIGHT LOSS: 0
COUGH: 0
FALLS: 0
DIAPHORESIS: 0
FOCAL WEAKNESS: 0
SHORTNESS OF BREATH: 1
SINUS PAIN: 0
EYE REDNESS: 0
PHOTOPHOBIA: 0
FEVER: 0
STRIDOR: 0
BLURRED VISION: 0
PND: 0
NECK PAIN: 0
DIARRHEA: 0
MYALGIAS: 0
DEPRESSION: 0
DOUBLE VISION: 0

## 2024-09-16 NOTE — Clinical Note
He did not bring up to sleep until the very end of the appointment we are already over time but apparently he has a nasal mask that sounds more like prongs?  He had a prior nasal mass that he liked better although is not sure if they are still making it.  His current mask starts to leak after 2 to 3 days and he is only given 2-3 masks a month.  I told him I would reach out to you to see if you could help.

## 2024-09-17 NOTE — PROGRESS NOTES
"Chief Complaint   Patient presents with    Follow-Up     LAST SEEN 3/13/24 BY ROS GABRIEL         HPI: This patient is a 83 y.o. male whom is followed in our clinic for COPD c/b chronic hypoxemic respiratory failure last seen by Charmaine SOMMER, on 3/13/24.  Patient has been prescribed supplemental oxygen which she uses at 2 L/min as needed with activity.  Last pulmonary function testing from January 23 showed moderate airflow obstruction with an FEV1/FVC ratio 42 and an FEV1 of 1.73 L or 70% predicted with moderate air trapping, borderline hyperinflation and DLCO 63% predicted.  He is a former tobacco smoker with chart history of 25 pack years but tobacco free since 1998.  He was on Trelegy but was transitioned to Breztri which she is not sure is helping.  At his last clinic visit he was complaining of increased shortness of breath but has since been diagnosed with LAD obstructive coronary artery disease and underwent drug-coated balloon therapy without stent placement in July.  This has improved shortness of breath.  He continues to feel winded easily with activity but also does not use his oxygen with any regularity.  He typically will rest and recover but amount of activity he tolerates has decreased.  No exacerbations since his last clinic visit.  He does have sleep apnea and is on CPAP therapy but currently having issues with his mask.  He is also requesting ipratropium and albuterol for his nebulizer which he feels improved his breathing more than Breztri.    Past Medical History:   Diagnosis Date    Breath shortness     Uses 02 @ 4L per nc at home prn     CATARACT     IOL OU    Cold     coughing up \"dirty\" sputum    Dental disorder     Full Upper    Emphysema of lung (HCC)     Hypercholesteremia     Indigestion     Infection     left knee post TKA    JEFF (obstructive sleep apnea)     AUTO CPAP 5-15CM WITH O2 4 LPM    Pain     low back pain left side    Psychiatric problem     takes med for depression "    Snoring     Uses CPAP       Social History     Socioeconomic History    Marital status:      Spouse name: Not on file    Number of children: Not on file    Years of education: Not on file    Highest education level: Not on file   Occupational History    Not on file   Tobacco Use    Smoking status: Former     Current packs/day: 0.00     Average packs/day: 1 pack/day for 25.0 years (25.0 ttl pk-yrs)     Types: Cigarettes     Start date: 1973     Quit date: 1998     Years since quittin.7    Smokeless tobacco: Never   Substance and Sexual Activity    Alcohol use: No     Alcohol/week: 0.0 oz    Drug use: No    Sexual activity: Not on file   Other Topics Concern    Not on file   Social History Narrative    Not on file     Social Determinants of Health     Financial Resource Strain: Not on file   Food Insecurity: Low Risk  (2024)    Received from [facility]    [facility] Food Insecurity     In the last month, did you feel there was not enough money for food?: No   Transportation Needs: Low Risk  (2024)    Received from [facility]    [facility] Transportation Needs     In the last month, have you not seen a doctor because you didn't have a way to get to the clinic or hospital?: No   Physical Activity: Not on file   Stress: Not on file   Social Connections: Not on file   Intimate Partner Violence: Not on file   Housing Stability: Low Risk  (2024)    Received from [facility]    [facility] Housing Needs     In the last month, was there a time when you were not able to pay your mortgage or rent?: No     In the last month, have you slept outside, in a shelter, in a car, or any place not meant for sleeping?: Not on file       No family history on file.    Current Outpatient Medications on File Prior to Visit   Medication Sig Dispense Refill    albuterol 108 (90 Base) MCG/ACT Aero Soln inhalation aerosol Inhale 2 Puffs every 6 hours as needed for Shortness of  Breath. 3 Each 3    BREZTRI AEROSPHERE 160-9-4.8 MCG/ACT Aerosol INHALE 2 PUFFS BY MOUTH TWICE DAILY 10.7 g 5    memantine (NAMENDA) 10 MG Tab Take 10 mg by mouth 2 times a day.      furosemide (LASIX) 20 MG Tab       prasugrel (EFFIENT) 10 MG Tab Take 10 mg by mouth every day.      sertraline (ZOLOFT) 50 MG Tab Take 50 mg by mouth every day.      simvastatin (ZOCOR) 80 MG tablet Take 80 mg by mouth every day.      lisinopril (PRINIVIL) 5 MG Tab Take 5 mg by mouth every day.      nitroglycerin (NITROSTAT) 0.4 MG SL Tab 1 tablet under the tongue and allow to dissolve as needed Sublingual every 5 min as needed for severe chest pain      Respiratory Therapy Supplies (CARETOUCH 2 CPAP HOSE ) Misc CPAP Machine 3      tamsulosin (FLOMAX) 0.4 MG capsule 1 capsule daily      aspirin 81 MG tablet Take 81 mg by mouth every day.      Multiple Vitamins-Minerals (OCUVITE PO) Take 1 Cap by mouth every day.      Multiple Vitamins-Minerals (CENTRUM SILVER ADULT 50+ PO) Take 1 Tab by mouth every day.      Omega-3 Fatty Acids (FISH OIL PO) Take 1 Cap by mouth every day.      Coenzyme Q10 (COQ-10) 100 MG Cap Take 1 Cap by mouth every day.      Ascorbic Acid (VITAMIN C PO) Take 500 mg by mouth every day.      Cobalamine Combinations (VITAMIN B12-FOLIC ACID PO) Take 1 Tab by mouth every day.      CEPHALEXIN 500 MG PO CAPS Take 1,000 mg by mouth every day. For an infection in the leg for 7 years.       No current facility-administered medications on file prior to visit.       Morphine      ROS:   Review of Systems   Constitutional:  Negative for chills, diaphoresis, fever, malaise/fatigue and weight loss.   HENT:  Negative for congestion, ear discharge, ear pain, hearing loss, nosebleeds, sinus pain, sore throat and tinnitus.    Eyes:  Negative for blurred vision, double vision, photophobia, pain, discharge and redness.   Respiratory:  Positive for shortness of breath. Negative for cough, hemoptysis, sputum production, wheezing and  "stridor.    Cardiovascular:  Negative for chest pain, palpitations, orthopnea, claudication, leg swelling and PND.   Gastrointestinal:  Negative for abdominal pain, constipation, diarrhea, heartburn, nausea and vomiting.   Genitourinary:  Negative for dysuria and urgency.   Musculoskeletal:  Negative for back pain, falls, joint pain, myalgias and neck pain.   Skin:  Negative for itching and rash.   Neurological:  Negative for dizziness, tremors, speech change, focal weakness, weakness and headaches.   Endo/Heme/Allergies:  Negative for environmental allergies.   Psychiatric/Behavioral:  Negative for depression.        /68 (BP Location: Right arm, Patient Position: Sitting, BP Cuff Size: Adult)   Pulse (!) 59   Ht 1.727 m (5' 8\")   Wt 95.3 kg (210 lb)   SpO2 91%   Physical Exam  Vitals reviewed.   Constitutional:       General: He is not in acute distress.     Appearance: Normal appearance. He is obese.   HENT:      Head: Normocephalic and atraumatic.      Right Ear: External ear normal.      Left Ear: External ear normal.      Nose: Nose normal. No congestion.      Mouth/Throat:      Mouth: Mucous membranes are moist.      Pharynx: Oropharynx is clear. No oropharyngeal exudate.   Eyes:      General: No scleral icterus.     Extraocular Movements: Extraocular movements intact.      Conjunctiva/sclera: Conjunctivae normal.      Pupils: Pupils are equal, round, and reactive to light.   Cardiovascular:      Rate and Rhythm: Normal rate and regular rhythm.      Heart sounds: Normal heart sounds. No murmur heard.     No gallop.   Pulmonary:      Effort: Pulmonary effort is normal. No respiratory distress.      Breath sounds: Normal breath sounds. No wheezing or rales.   Abdominal:      General: There is no distension.      Palpations: Abdomen is soft.   Musculoskeletal:         General: Normal range of motion.      Cervical back: Normal range of motion and neck supple.      Right lower leg: No edema.      Left " lower leg: No edema.   Skin:     General: Skin is warm and dry.      Findings: No rash.   Neurological:      Mental Status: He is alert and oriented to person, place, and time.      Cranial Nerves: No cranial nerve deficit.   Psychiatric:         Mood and Affect: Mood normal.         Behavior: Behavior normal.         PFTs as reviewed by me personally: as per hPI    Imaging as reviewed by me personally:  as per hPI    Assessment:  1. Chronic obstructive pulmonary disease, unspecified COPD type (HCC)  ipratropium-albuterol (DUONEB) 0.5-2.5 (3) MG/3ML nebulizer solution      2. Chronic respiratory failure with hypoxia (HCC)        3. Obstructive sleep apnea        4. Former smoker        5. Pulmonary nodules            Plan:  Chronic and moderate to severe with mMRC functional status 3.  I encouraged him to continue his Breztri and okay to start nebulized bronchodilators for airway clearance.  We had a long discussion about the use of oxygen as needed and while it is not critical may help him to tolerate more physical activity.  He will start exercising on his exercise bike to see if he can build up better endurance.  Tobacco free.  Chronic and secondary to COPD.  Has supplemental oxygen for use with activity.  We discussed that this may help him tolerate more physical activity and help improve his exercise tolerance.  Continue supplemental oxygen 2 L/min with activity.  Followed by sleep medicine and reports compliance with therapy.  Apparently had a different nasal mask in the past which she was hoping to get.  His current mask starts to leak after 2 or 3 days of usage; will CC sleep provider to see if we can troubleshoot  Tobacco free encouraged ongoing abstinence.  These are stable x 2 years and patient opted to discontinue surveillance imaging.  Return in about 6 months (around 3/16/2025) for Charmaine Martin pt for COPD, JEFF .

## 2025-02-16 DIAGNOSIS — J44.9 CHRONIC OBSTRUCTIVE PULMONARY DISEASE, UNSPECIFIED COPD TYPE (HCC): ICD-10-CM

## 2025-02-18 RX ORDER — BUDESONIDE, GLYCOPYRROLATE, AND FORMOTEROL FUMARATE 160; 9; 4.8 UG/1; UG/1; UG/1
2 AEROSOL, METERED RESPIRATORY (INHALATION) 2 TIMES DAILY
Qty: 10.7 G | Refills: 11 | Status: SHIPPED | OUTPATIENT
Start: 2025-02-18

## 2025-02-18 NOTE — TELEPHONE ENCOUNTER
Received request via: Pharmacy    Was the patient seen in the last year in this department? Yes 9/16/24 Anamaria Rizzo     Does the patient have an active prescription (recently filled or refills available) for medication(s) requested? No    Pharmacy Name: [facility]     Does the patient have care home Plus and need 100-day supply? (This applies to ALL medications) Patient does not have SCP

## 2025-02-22 ENCOUNTER — TELEPHONE (OUTPATIENT)
Dept: SLEEP MEDICINE | Facility: MEDICAL CENTER | Age: 85
End: 2025-02-22
Payer: COMMERCIAL

## 2025-02-22 NOTE — TELEPHONE ENCOUNTER
Received Renewal request via [employer]  for Budeson-Glycopyrrol-Formoterol (BREZTRI AEROSPHERE) 160-9-4.8 MCG/ACT Aerosol . (Quantity:10.7 g, Day Supply:30)     Insurance: [employer]/ [employer]  Member ID:  [id number]  BIN: [id number]  PCN: [id number]  Group: [id number]     Ran Test claim via [place] [employer] medication Pays for a $25.00 copay. Will outreach to patient to offer specialty pharmacy services and or release to preferred pharmacy    Junie Cardoza [facility]   Pharmacy Liaison  [phone]

## 2025-03-18 ENCOUNTER — OFFICE VISIT (OUTPATIENT)
Dept: SLEEP MEDICINE | Facility: MEDICAL CENTER | Age: 85
End: 2025-03-18
Attending: NURSE PRACTITIONER
Payer: COMMERCIAL

## 2025-03-18 VITALS
HEART RATE: 44 BPM | SYSTOLIC BLOOD PRESSURE: 118 MMHG | WEIGHT: 222 LBS | DIASTOLIC BLOOD PRESSURE: 70 MMHG | OXYGEN SATURATION: 94 % | BODY MASS INDEX: 32.88 KG/M2 | HEIGHT: 69 IN

## 2025-03-18 DIAGNOSIS — G47.33 OBSTRUCTIVE SLEEP APNEA: Chronic | ICD-10-CM

## 2025-03-18 DIAGNOSIS — J44.9 CHRONIC OBSTRUCTIVE PULMONARY DISEASE, UNSPECIFIED COPD TYPE (HCC): ICD-10-CM

## 2025-03-18 DIAGNOSIS — Z87.891 FORMER SMOKER: ICD-10-CM

## 2025-03-18 DIAGNOSIS — J96.11 CHRONIC RESPIRATORY FAILURE WITH HYPOXIA (HCC): ICD-10-CM

## 2025-03-18 PROCEDURE — 99212 OFFICE O/P EST SF 10 MIN: CPT | Mod: XU | Performed by: NURSE PRACTITIONER

## 2025-03-18 PROCEDURE — 99214 OFFICE O/P EST MOD 30 MIN: CPT | Performed by: NURSE PRACTITIONER

## 2025-03-18 PROCEDURE — 94761 N-INVAS EAR/PLS OXIMETRY MLT: CPT | Performed by: NURSE PRACTITIONER

## 2025-03-18 RX ORDER — ALBUTEROL SULFATE 90 UG/1
2 INHALANT RESPIRATORY (INHALATION) EVERY 6 HOURS PRN
Qty: 3 EACH | Refills: 3 | Status: SHIPPED | OUTPATIENT
Start: 2025-03-18

## 2025-03-18 ASSESSMENT — PATIENT HEALTH QUESTIONNAIRE - PHQ9: CLINICAL INTERPRETATION OF PHQ2 SCORE: 0

## 2025-03-18 NOTE — PROGRESS NOTES
Chief Complaint   Patient presents with    Follow-Up     Dx/Last seen:  Chronic obstructive pulmonary disease, unspecified COPD type (HCC) 9/16/24 Anamaria Rizzo     O2 or/and CPAP:  supplemental oxygen 4 L/min       HPI:  Danielito Tolliver is a 84 y.o. year old male here today for follow-up on moderate COPD and JEFF. Hx of pulmonary nodules that were stable >2yrs and patient deferred repeat imaging.  Last OV 9/16/24 with Dr. Rizzo    MMRC rdGrdrrdarddrderd:rd rd3rd Exacerbations this year: 0    He continues to use auto CPAP 5 to 15 cm with 4 L oxygen bleed and nightly; ResMed device obtained 2021.   Compliance report 2/16/2025 through 3/17/2025 indicates 18 nights of use, average nightly use 8 hours 11 minutes, mean pressure 12 cm, moderate mask leak with reduced AHI of 0.9/h.  Reviewed with patient.  Break in therapy due to his wife recently passing away. Reviewed with patient.    Interval events:  3/18/25: Accompanied by his daughter.  He has concerns about using oxygen and if there is a way to improve his lungs so he does not have to use it.  He is under the care of cardiology for history of chronic bradycardia pending follow-up in April.  He notes after his procedure with them his heart rate had gone up and improved but now coming back down again.  He has been dehydrated and using Gatorade 3 times daily for hydration.  He has an Inogen POC he uses periodically with activity.  He denies chest pain or chest tightness, wheezing.  No recent exacerbations.  He uses albuterol inhaler twice daily but unsure of benefit.  He does have a nebulizer on hand but not needing it regularly.  Sleeping well with CPAP without issue.  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  He notes being sedentary during the winter and generally being side.  He stopped doing his stationary bike.  He has albuterol HFA inhaler in multiple places but has not reached for it.  He notes  shortness of breath with exertion and general activity.  He is using Breztri 2 puffs twice daily with spacer but unsure if it is working correctly since he is no longer coughing up things or phlegm.  He denies chest pain, chest tightness or wheezing.  No recent exacerbations.     He continues to sleep on therapy and has no complaints.     PULM & SLEEP HX:   Former smoker, quit 1998 with 25PYH.   PFT 5/15/2017 indicates FVC 4.74 L or 135% predicted, FEV1 2.53 L or 100% predicted, FEV1/FVC ratio 53, %, DLCO 90% predicted.   Spirometry 6/5/2019 indicates FEV1 2.06 L or 84%, FEV1/FVC ratio 47%, and mid flows at 37%.  Patient did a significant bronchodilator response.  Grant 11/6/2019 shows further decline with FEV1 1.87 L or 77%, FEV1/FVC ratio 51%.  Patient did have a significant bronchodilator response.  I reviewed finds with patient.  PFT 2/25/20 noted FEV1 2.16L or 84%, FEV1/FVC ratio 49, % and DLCO 84%.  PFT 3/11/21 notes FEV1 2.06L or 81%, FEV1/FVC ratio 48, %, and DLCO 78%. No significant bronchodilator response.   PFT results today noted moderate COPD with decline noted from prior PFT March 2021.  FVC 3.5 L 105%, FEV1 1.59 L or 64%, FEV1/FVC ratio 45, %, TLC 7.05 L 112% and DLCO 65% predicted.  He did have a significant bronchodilator response.   PFT 1/14/2023 notes moderate obstructive ventilatory defect with FEV1 1.73 L or 70%, FEV1/FVC ratio 42, RV 3.82 L 152%, TLC 7.66 L 122% and DLCO 63% predicted with significant bronchodilator response noted. Compared to November 2022 study slight improvement in FVC, FEV1 and TLC.       Patient underwent heart cath 2017 and started having weight loss. His dyspnea significanly improved and he stopped inhalers.   Echo 9/15/2022 noted mild concentric left ventricular hypertrophy, normal left ventricular systolic function with LVEF 60 to 65%.  Mild MR and RVSP 55 to 60 mmHg.  Reviewed with patient.  PET scan 3/11/2022 noted focal FDG accumulation in  "right prostate noting SUV 11.9.  Focal uptake in right ischium 6.6.  He is currently under the care of [facility] and receiving injection therapy.      He has a history of an abnormal CT scan with pulmonary nodules. The nodules were stable for 2 years; last CT was 7/21/2009. CT scan at [facility] 2/23/16 indicates 8mm noncalcified pulmonary nodule in the right middle lobe and severe centrilobular emphysema. Addendum noted stability of nodule and no more imaging warranted.   CTA of chest 9/20/2022 that noted no PE, pulmonary emphysema and prior 7 to 8 mm nodule present along minor fissure.  This was not compared to prior imaging but appears to be the same nodule based on review of prior reports.  Patient declines any further imaging at this time     HST 2013 indicated an overall AHI of 15 with a minimum oxygen saturation of 77%.  Patient currently uses RESMED auto CPAP 5-15cm H2O with 4 L oxygen bleed in; OBTAINED 2021. He will continue to benefit from night time O2 therapy.  He now has his old device in [place] for when he travels to see his wife.  CNOX 10/17/2015 indicated normal oximetry on that setting.  CNOX 3/30/23 auto CPAP 5 to 15 cm with 4 L oxygen bleed in indicates basal spo2 95.5%, <88% 1min of the night.     ROS: As per HPI and otherwise negative if not stated.    Past Medical History:   Diagnosis Date    Breath shortness     Uses 02 @ 4L per nc at home prn     CATARACT     IOL OU    Cold     coughing up \"dirty\" sputum    Dental disorder     Full Upper    Emphysema of lung (HCC)     Hypercholesteremia     Indigestion     Infection     left knee post TKA    JEFF (obstructive sleep apnea)     AUTO CPAP 5-15CM WITH O2 4 LPM    Pain     low back pain left side    Psychiatric problem     takes med for depression    Snoring     Uses CPAP       Past Surgical History:   Procedure Laterality Date    KNEE ARTHROTOMY Left 3/24/2016    Procedure: KNEE ARTHROTOMY-HARDWARE REMOVAL AND HETEROTOPIC OSSIFICATION;  " "Surgeon: Yasmani Bradley M.D.;  Location: SURGERY [facility];  Service:     OTHER ORTHOPEDIC SURGERY  3-21-16    \"Knee ReplacementSurgeries Left kneex3 Rightx2\"     OTHER CARDIAC SURGERY      \"Open Heart-Coronary Artery Bypass, Heart Stent\"    BRONCHOSCOPY-ENDO  3/25/2009    Performed by SINCERE PABLO at SURGERY [facility]    CATARACT EXTRACTION WITH IOL      B/L    OTHER      TKA B/L       History reviewed. No pertinent family history.    Social History     Socioeconomic History    Marital status:      Spouse name: Not on file    Number of children: Not on file    Years of education: Not on file    Highest education level: Not on file   Occupational History    Not on file   Tobacco Use    Smoking status: Former     Current packs/day: 0.00     Average packs/day: 1 pack/day for 25.0 years (25.0 ttl pk-yrs)     Types: Cigarettes     Start date: 1973     Quit date: 1998     Years since quittin.2    Smokeless tobacco: Never   Vaping Use    Vaping status: Never Used   Substance and Sexual Activity    Alcohol use: No     Alcohol/week: 0.0 oz    Drug use: No    Sexual activity: Not on file   Other Topics Concern    Not on file   Social History Narrative    Not on file     Social Drivers of Health     Financial Resource Strain: Not on file   Food Insecurity: Low Risk  (2025)    Received from [facility]    SINCE Food Insecurity     In the last month, did you feel there was not enough money for food?: No   Transportation Needs: Low Risk  (2025)    Received from [facility]    SINCE Transportation Needs     In the last month, have you not seen a doctor because you didn't have a way to get to the clinic or hospital?: No   Physical Activity: Not on file   Stress: Not on file   Social Connections: Not on file   Intimate Partner Violence: Not on file   Housing Stability: Low Risk  (2025)    Received from [facility]    SINCERE " "Housing Needs     In the last month, was there a time when you were not able to pay your mortgage or rent?: No     In the last month, have you slept outside, in a shelter, in a car, or any place not meant for sleeping?: Not on file       Allergies as of 03/18/2025 - Reviewed 03/18/2025   Allergen Reaction Noted    Morphine Anxiety 03/21/2016        Vitals:  /70   Pulse (!) 44   Ht 1.753 m (5' 9\")   Wt 101 kg (222 lb)   SpO2 94%     Current medications as of today   Current Outpatient Medications   Medication Sig Dispense Refill    albuterol 108 (90 Base) MCG/ACT Aero Soln inhalation aerosol Inhale 2 Puffs every 6 hours as needed for Shortness of Breath. 3 Each 3    BREZTRI AEROSPHERE 160-9-4.8 MCG/ACT Aerosol INHALE 2 PUFFS BY MOUTH TWICE DAILY 10.7 g 11    ipratropium-albuterol (DUONEB) 0.5-2.5 (3) MG/3ML nebulizer solution Take 3 mL by nebulization every four hours as needed for Shortness of Breath. 120 mL 11    memantine (NAMENDA) 10 MG Tab Take 10 mg by mouth 2 times a day.      furosemide (LASIX) 20 MG Tab       nitroglycerin (NITROSTAT) 0.4 MG SL Tab 1 tablet under the tongue and allow to dissolve as needed Sublingual every 5 min as needed for severe chest pain      prasugrel (EFFIENT) 10 MG Tab Take 10 mg by mouth every day.      Respiratory Therapy Supplies (CARETOUCH 2 CPAP HOSE ) Misc CPAP Machine 3      tamsulosin (FLOMAX) 0.4 MG capsule 1 capsule daily      sertraline (ZOLOFT) 50 MG Tab Take 50 mg by mouth every day.      aspirin 81 MG tablet Take 81 mg by mouth every day.      simvastatin (ZOCOR) 80 MG tablet Take 80 mg by mouth every day.      Multiple Vitamins-Minerals (OCUVITE PO) Take 1 Cap by mouth every day.      Multiple Vitamins-Minerals (CENTRUM SILVER ADULT 50+ PO) Take 1 Tab by mouth every day.      Omega-3 Fatty Acids (FISH OIL PO) Take 1 Cap by mouth every day.      Coenzyme Q10 (COQ-10) 100 MG Cap Take 1 Cap by mouth every day.      Ascorbic Acid (VITAMIN C PO) Take 500 mg " by mouth every day.      lisinopril (PRINIVIL) 5 MG Tab Take 5 mg by mouth every day.      Cobalamine Combinations (VITAMIN B12-FOLIC ACID PO) Take 1 Tab by mouth every day.      CEPHALEXIN 500 MG PO CAPS Take 1,000 mg by mouth every day. For an infection in the leg for 7 years.       No current facility-administered medications for this visit.         Physical Exam:   Gen:           Alert and oriented, No apparent distress. Mood and affect appropriate, normal interaction with examiner.  Eyes:          PERRL, EOM intact, sclere white, conjunctive moist.  Ears:          Not examined.   Hearing:     Grossly intact.  Nose:          Normal, no lesions or deformities.  Dentition:    Not examined.   Oropharynx:   Not examined.   Mallampati Classification: Not examined.   Neck:        Supple, trachea midline, no masses.  Respiratory Effort: No intercostal retractions or use of accessory muscles.   Lung Auscultation:      Clear to auscultation bilaterally; no rales, rhonchi or wheezing.  CV:            Regular rate and rhythm. No murmurs, rubs or gallops.  Abd:           Not examined.   Lymphadenopathy: Not examined.   Gait and Station: Normal.  Digits and Nails: No clubbing, cyanosis, petechiae, or nodes.   Cranial Nerves: II-XII grossly intact.  Skin:        No rashes, lesions or ulcers noted.               Ext:           No cyanosis or edema.      Assessment:  1. Chronic obstructive pulmonary disease, unspecified COPD type (HCC)  DME Mask and Supplies    Multiple Oximetry    albuterol 108 (90 Base) MCG/ACT Aero Soln inhalation aerosol    Multiple Oximetry    DME O2 New Set Up      2. Chronic respiratory failure with hypoxia (HCC)  DME O2 New Set Up      3. Obstructive sleep apnea        4. BMI 32.0-32.9,adult        5. Former smoker                 Immunizations:    Flu:12/2024  Pneumovax 23:2014  Prevnar 13:2015  PCV 20: not due  COVID-19: 2021    Plan:  He is clinically stable but he is grieving from his wife recently  passing.  He is unsure of benefit of albuterol inhaler.  He is compliant with Breztri.  He will continue to benefit from current bronchodilators.  Reviewed appropriate use.  He has a nebulizer on hand if needed.  Refills provided.   Refills  Patient will continue to benefit from oxygen with exertion and at night.  Testing updated in office.   Multi ox   DME O2  JEFF is well-controlled using CPAP with O2.   DME mask/supplies  Encourage weight loss healthy diet and activity.  Follow-up in 6 months to review symptoms, sooner if needed.    Please note that this dictation was created using voice recognition software. I have made every reasonable attempt to correct obvious errors, but it is possible there are errors of grammar and possibly content that I did not discover before finalizing the note.

## 2025-03-18 NOTE — PROCEDURES
Multi-Ox Readings  Multi Ox #1 Room air   O2 sat % at rest 87   O2 sat % on exertion     O2 sat average on exertion     Multi Ox #2 4 LPM   O2 sat % at rest 94   O2 sat % on exertion 92   O2 sat average on exertion 93     Oxygen Use     Oxygen Frequency     Duration of need     Is the patient mobile within the home?     CPAP Use?     BIPAP Use?     Servo Titration

## 2025-07-09 ENCOUNTER — APPOINTMENT (OUTPATIENT)
Dept: SLEEP MEDICINE | Facility: MEDICAL CENTER | Age: 85
End: 2025-07-09
Attending: NURSE PRACTITIONER
Payer: COMMERCIAL

## 2025-07-09 ENCOUNTER — OFFICE VISIT (OUTPATIENT)
Dept: SLEEP MEDICINE | Facility: MEDICAL CENTER | Age: 85
End: 2025-07-09
Payer: COMMERCIAL

## 2025-07-09 VITALS
HEIGHT: 68 IN | OXYGEN SATURATION: 95 % | WEIGHT: 212 LBS | BODY MASS INDEX: 32.13 KG/M2 | DIASTOLIC BLOOD PRESSURE: 64 MMHG | SYSTOLIC BLOOD PRESSURE: 122 MMHG | HEART RATE: 66 BPM

## 2025-07-09 DIAGNOSIS — G47.33 OBSTRUCTIVE SLEEP APNEA: Chronic | ICD-10-CM

## 2025-07-09 DIAGNOSIS — J96.11 CHRONIC RESPIRATORY FAILURE WITH HYPOXIA (HCC): Chronic | ICD-10-CM

## 2025-07-09 DIAGNOSIS — Z87.891 FORMER SMOKER: ICD-10-CM

## 2025-07-09 DIAGNOSIS — J44.9 CHRONIC OBSTRUCTIVE PULMONARY DISEASE, UNSPECIFIED COPD TYPE (HCC): Primary | ICD-10-CM

## 2025-07-09 PROCEDURE — 3078F DIAST BP <80 MM HG: CPT | Performed by: NURSE PRACTITIONER

## 2025-07-09 PROCEDURE — 3074F SYST BP LT 130 MM HG: CPT | Performed by: NURSE PRACTITIONER

## 2025-07-09 PROCEDURE — 99215 OFFICE O/P EST HI 40 MIN: CPT | Performed by: NURSE PRACTITIONER

## 2025-07-09 RX ORDER — DONEPEZIL HYDROCHLORIDE 10 MG/1
10 TABLET, FILM COATED ORAL 2 TIMES DAILY
COMMUNITY
Start: 2025-04-26

## 2025-07-09 RX ORDER — PREDNISONE 10 MG/1
TABLET ORAL
Qty: 40 TABLET | Refills: 0 | Status: SHIPPED | OUTPATIENT
Start: 2025-07-09

## 2025-07-09 RX ORDER — BICALUTAMIDE 50 MG/1
50 TABLET, FILM COATED ORAL DAILY
COMMUNITY
Start: 2025-05-06

## 2025-07-09 RX ORDER — AZITHROMYCIN 250 MG/1
TABLET, FILM COATED ORAL
Qty: 6 TABLET | Refills: 0 | Status: SHIPPED | OUTPATIENT
Start: 2025-07-09

## 2025-07-09 RX ORDER — ISOSORBIDE MONONITRATE 30 MG/1
30 TABLET, EXTENDED RELEASE ORAL EVERY MORNING
COMMUNITY

## 2025-07-09 NOTE — PATIENT INSTRUCTIONS
Start using bike daily  Start using incentive spirometer 10x's per day  Get flutter valve off [email or web address] or [email or web address] - check prices

## 2025-07-09 NOTE — PROGRESS NOTES
Chief Complaint   Patient presents with    Follow-Up     Dx/Last seen: Chronic obstructive pulmonary disease, unspecified COPD type (HCC) 3/18/25 Charmaine Martin     O2 or/and CPAP: supplemental oxygen 4 L/min   CPAP 5 to 15 cm with 4 L oxygen bleed and nightly; ResMed device obtained 2021.    Other     Daughter states that he has lost lots of his breathing capacity in the last month or so .       HPI:  Danielito Tolliver is a 84 y.o. year old male here today for follow-up on  moderate COPD and JEFF. Hx of pulmonary nodules that were stable >2yrs and patient deferred repeat imaging.  Last OV 3/18/25.     MMRC thGthrthathdtheth:th th4th Exacerbations this year: 0    Currently prescribed Breztri 2 puffs BID, nebulizer/RESHMA prn.  Using oxygen 4LPM 24/7.    Using auto CPAP 5 to 15 cm with 4 L oxygen bleed and nightly; ResMed device obtained 2021.     Interval events:  7/9/25: Accompanied by his daughter.  He continues to live at home but has someone with him every day.  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  His mobility is less and he is needing oxygen consistently now.  He feels in the last month his shortness of breath is worsened.  Recently saw cardiology with testing not indicating any significant changes.  He notes having some congestion that does not seem to be coming up as it did previously.  He is continuing to use CPAP with O2 with no issue.  No recent illness.  He is not using his stationary bike as he was previously.  He is not doing breathing exercises.  He admits to being depressed and on antidepressant medication per PCP.  3/18/25: Accompanied by his daughter.  He has concerns about using oxygen and if there is a way to improve his lungs so he does not have to use it.  He is under the care of cardiology for history of chronic bradycardia pending follow-up in April.  He notes after his procedure with them his heart rate had gone up and improved but now coming back down again.  He has been dehydrated and  using Gatorade 3 times daily for hydration.  He has an Inogen POC he uses periodically with activity.  He denies chest pain or chest tightness, wheezing.  No recent exacerbations.  He uses albuterol inhaler twice daily but unsure of benefit.  He does have a nebulizer on hand but not needing it regularly.  Sleeping well with CPAP without issue.  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  He notes being sedentary during the winter and generally being side.  He stopped doing his stationary bike.  He has albuterol HFA inhaler in multiple places but has not reached for it.  He notes shortness of breath with exertion and general activity.  He is using Breztri 2 puffs twice daily with spacer but unsure if it is working correctly since he is no longer coughing up things or phlegm.  He denies chest pain, chest tightness or wheezing.  No recent exacerbations.     He continues to sleep on therapy and has no complaints.     PULM & SLEEP HX:   Former smoker, quit 1998 with 25PYH.   PFT 5/15/2017 indicates FVC 4.74 L or 135% predicted, FEV1 2.53 L or 100% predicted, FEV1/FVC ratio 53, %, DLCO 90% predicted.   Spirometry 6/5/2019 indicates FEV1 2.06 L or 84%, FEV1/FVC ratio 47%, and mid flows at 37%.  Patient did a significant bronchodilator response.  Cipriano 11/6/2019 shows further decline with FEV1 1.87 L or 77%, FEV1/FVC ratio 51%.  Patient did have a significant bronchodilator response.  I reviewed finds with patient.  PFT 2/25/20 noted FEV1 2.16L or 84%, FEV1/FVC ratio 49, % and DLCO 84%.  PFT 3/11/21 notes FEV1 2.06L or 81%, FEV1/FVC ratio 48, %, and DLCO 78%. No significant bronchodilator response.   PFT results today noted moderate COPD with decline noted from prior PFT March 2021.  FVC 3.5 L 105%, FEV1 1.59 L or 64%, FEV1/FVC ratio 45, %, TLC 7.05 L 112% and DLCO 65% predicted.  He did have a significant bronchodilator  response.   PFT 1/14/2023 notes moderate obstructive ventilatory defect with FEV1 1.73 L or 70%, FEV1/FVC ratio 42, RV 3.82 L 152%, TLC 7.66 L 122% and DLCO 63% predicted with significant bronchodilator response noted. Compared to November 2022 study slight improvement in FVC, FEV1 and TLC.       Patient underwent heart cath 2017 and started having weight loss. His dyspnea significanly improved and he stopped inhalers.   Echo 9/15/2022 noted mild concentric left ventricular hypertrophy, normal left ventricular systolic function with LVEF 60 to 65%.  Mild MR and RVSP 55 to 60 mmHg.  Reviewed with patient.  PET scan 3/11/2022 noted focal FDG accumulation in right prostate noting SUV 11.9.  Focal uptake in right ischium 6.6.  He is currently under the care of [facility] and receiving injection therapy.      He has a history of an abnormal CT scan with pulmonary nodules. The nodules were stable for 2 years; last CT was 7/21/2009. CT scan at [facility] 2/23/16 indicates 8mm noncalcified pulmonary nodule in the right middle lobe and severe centrilobular emphysema. Addendum noted stability of nodule and no more imaging warranted.   CTA of chest 9/20/2022 that noted no PE, pulmonary emphysema and prior 7 to 8 mm nodule present along minor fissure.  This was not compared to prior imaging but appears to be the same nodule based on review of prior reports.  Patient declines any further imaging at this time     HST 2013 indicated an overall AHI of 15 with a minimum oxygen saturation of 77%.  Patient currently uses RESMED auto CPAP 5-15cm H2O with 4 L oxygen bleed in; OBTAINED 2021. He will continue to benefit from night time O2 therapy.  He now has his old device in [place] for when he travels to see his wife.  CNOX 10/17/2015 indicated normal oximetry on that setting.  CNOX 3/30/23 auto CPAP 5 to 15 cm with 4 L oxygen bleed in indicates basal spo2 95.5%, <88% 1min of the night.     ROS: As per HPI and otherwise negative if not  stated.    Past Medical History[1]    Past Surgical History[2]    History reviewed. No pertinent family history.    Social History     Socioeconomic History    Marital status:      Spouse name: Not on file    Number of children: Not on file    Years of education: Not on file    Highest education level: Not on file   Occupational History    Not on file   Tobacco Use    Smoking status: Former     Current packs/day: 0.00     Average packs/day: 1 pack/day for 25.0 years (25.0 ttl pk-yrs)     Types: Cigarettes     Start date: 1973     Quit date: 1998     Years since quittin.5    Smokeless tobacco: Never   Vaping Use    Vaping status: Never Used   Substance and Sexual Activity    Alcohol use: No     Alcohol/week: 0.0 oz    Drug use: No    Sexual activity: Not on file   Other Topics Concern    Not on file   Social History Narrative    Not on file     Social Drivers of Health     Financial Resource Strain: Not on file   Food Insecurity: Low Risk  (2025)    Received from [facility]    [facility] Food Insecurity     In the last month, did you feel there was not enough money for food?: No   Transportation Needs: Low Risk  (2025)    Received from [facility]    [facility] Transportation Needs     In the last month, have you not seen a doctor because you didn't have a way to get to the clinic or hospital?: No   Physical Activity: Not on file   Stress: Not on file   Social Connections: Not on file   Intimate Partner Violence: Not on file   Housing Stability: Low Risk  (2025)    Received from [facility]    [facility] Housing Needs     In the last month, was there a time when you were not able to pay your mortgage or rent?: No     In the last month, have you slept outside, in a shelter, in a car, or any place not meant for sleeping?: Not on file       Allergies as of 2025 - Reviewed 2025   Allergen Reaction Noted    Morphine Anxiety 2016  "       Vitals:  /64   Pulse 66   Ht 1.727 m (5' 8\")   Wt 96.2 kg (212 lb)   SpO2 95%     Current medications as of today Current Medications[3]      Physical Exam:   Gen:           Alert and oriented, No apparent distress. Mood and affect appropriate, normal interaction with examiner.  Eyes:          PERRL, EOM intact, sclere white, conjunctive moist.  Ears:          Not examined.   Hearing:     Grossly intact.  Nose:          Normal, no lesions or deformities.  Dentition:    Not examined.   Oropharynx:   Not examined.   Mallampati Classification: Not examined.   Neck:        Supple, trachea midline, no masses.  Respiratory Effort: No intercostal retractions or use of accessory muscles.   Lung Auscultation:      Diminished t/o; no rales, rhonchi or wheezing.  CV:            Regular rate and rhythm. No murmurs, rubs or gallops.  Abd:           Not examined.   Lymphadenopathy: Not examined.   Gait and Station: Normal.  Digits and Nails: No clubbing, cyanosis, petechiae, or nodes.   Cranial Nerves: II-XII grossly intact.  Skin:        No rashes, lesions or ulcers noted.               Ext:           No cyanosis or edema.      Assessment:  1. Chronic obstructive pulmonary disease, unspecified COPD type (HCC)  Budeson-Glycopyrrol-Formoterol (BREZTRI AEROSPHERE) 160-9-4.8 MCG/ACT Aerosol    azithromycin (ZITHROMAX Z-TIGRE) 250 MG Tab    predniSONE (DELTASONE) 10 MG Tab      2. Chronic respiratory failure with hypoxia (HCC)        3. Obstructive sleep apnea        4. BMI 32.0-32.9,adult  HEIGHT AND WEIGHT      5. Former smoker                 Immunizations:    Flu:12/2024  Pneumovax 23:2014  Prevnar 13:not due  PCV 20: 2022  COVID-19: 2021    Plan:  COPD is most likely progressed but no recent exacerbations and patient declines PFT to confirm. He is now noting he needs oxygen consistently and unable to go without. He admits to depression which has curved his activity level as well. He is deconditioned which is " "contributing to progression.  He will continue current bronchodilators as prescribed.  Reviewed use of albuterol HFA inhaler versus nebulizer.  Recommend restarting stationary bike and goal is 30 minutes daily for conditioning.  Also reviewed breathing exercises with incentive spirometer and flutter valve that he can obtain.  Pleated dental work today and placed on antibiotic.  He will complete antibiotic course and is still having increased cough and phlegm with congestion may start Z-Peter with prednisone taper.   RX zpak/azith   Refill Breztri  He will continue to benefit from oxygen 24/7.  Sleep apnea is well treated and he will continue to benefit from nightly therapy.  Encourage weight loss or healthy diet and activity.  Follow-up in 2 to 3 months for symptom check with compliance report, sooner if needed.    Please note that this dictation was created using voice recognition software. I have made every reasonable attempt to correct obvious errors, but it is possible there are errors of grammar and possibly content that I did not discover before finalizing the note.           [1]   Past Medical History:  Diagnosis Date    Breath shortness     Uses 02 @ 4L per nc at home prn     CATARACT     IOL OU    Cold     coughing up \"dirty\" sputum    Dental disorder     Full Upper    Emphysema of lung (HCC)     Hypercholesteremia     Indigestion     Infection     left knee post TKA    JEFF (obstructive sleep apnea)     AUTO CPAP 5-15CM WITH O2 4 LPM    Pain     low back pain left side    Psychiatric problem     takes med for depression    Snoring     Uses CPAP   [2]   Past Surgical History:  Procedure Laterality Date    KNEE ARTHROTOMY Left 3/24/2016    Procedure: KNEE ARTHROTOMY-HARDWARE REMOVAL AND HETEROTOPIC OSSIFICATION;  Surgeon: Yasmani Bradley M.D.;  Location: SURGERY [facility];  Service:     OTHER ORTHOPEDIC SURGERY  3-21-16    \"Knee ReplacementSurgeries Left kneex3 Rightx2\"     OTHER CARDIAC SURGERY  2013    " "\"Open Heart-Coronary Artery Bypass, Heart Stent\"    BRONCHOSCOPY-ENDO  3/25/2009    Performed by [facility] PABLO at SURGERY [facility]    CATARACT EXTRACTION WITH IOL      B/L    OTHER      TKA B/L   [3]   Current Outpatient Medications   Medication Sig Dispense Refill    Budeson-Glycopyrrol-Formoterol (BREZTRI AEROSPHERE) 160-9-4.8 MCG/ACT Aerosol Inhale 2 Puffs 2 times a day. 32.1 g 3    azithromycin (ZITHROMAX Z-TIGRE) 250 MG Tab Take 2 tablets on day 1. Then take 1 tablet a day for 4 days. 6 Tablet 0    predniSONE (DELTASONE) 10 MG Tab Take 40mg x 4 days, then take 30mg x 4 days, then take 20mg x 4 days, then 10mg x 4 days with food, then discontinue. 40 Tablet 0    donepezil (ARICEPT) 10 MG tablet Take 10 mg by mouth 2 times a day.      bicalutamide (CASODEX) 50 MG Tab Take 50 mg by mouth every day.      albuterol 108 (90 Base) MCG/ACT Aero Soln inhalation aerosol Inhale 2 Puffs every 6 hours as needed for Shortness of Breath. 3 Each 3    ipratropium-albuterol (DUONEB) 0.5-2.5 (3) MG/3ML nebulizer solution Take 3 mL by nebulization every four hours as needed for Shortness of Breath. 120 mL 11    memantine (NAMENDA) 10 MG Tab Take 10 mg by mouth 2 times a day.      furosemide (LASIX) 20 MG Tab       nitroglycerin (NITROSTAT) 0.4 MG SL Tab 1 tablet under the tongue and allow to dissolve as needed Sublingual every 5 min as needed for severe chest pain      prasugrel (EFFIENT) 10 MG Tab Take 10 mg by mouth every day.      Respiratory Therapy Supplies (CARETOUCH 2 CPAP HOSE ) Misc CPAP Machine 3      tamsulosin (FLOMAX) 0.4 MG capsule 1 capsule daily      sertraline (ZOLOFT) 50 MG Tab Take 50 mg by mouth every day.      aspirin 81 MG tablet Take 81 mg by mouth every day.      simvastatin (ZOCOR) 80 MG tablet Take 80 mg by mouth every day.      Multiple Vitamins-Minerals (OCUVITE PO) Take 1 Cap by mouth every day.      Multiple Vitamins-Minerals (CENTRUM SILVER ADULT 50+ PO) Take 1 Tab by mouth every day. "      Omega-3 Fatty Acids (FISH OIL PO) Take 1 Cap by mouth every day.      Coenzyme Q10 (COQ-10) 100 MG Cap Take 1 Cap by mouth every day.      Ascorbic Acid (VITAMIN C PO) Take 500 mg by mouth every day.      lisinopril (PRINIVIL) 5 MG Tab Take 5 mg by mouth every day.      Cobalamine Combinations (VITAMIN B12-FOLIC ACID PO) Take 1 Tab by mouth every day.      CEPHALEXIN 500 MG PO CAPS Take 1,000 mg by mouth every day. For an infection in the leg for 7 years.      isosorbide mononitrate SR (IMDUR) 30 MG TABLET SR 24 HR Take 30 mg by mouth every morning.       No current facility-administered medications for this visit.